# Patient Record
Sex: FEMALE | Race: WHITE | Employment: OTHER | URBAN - METROPOLITAN AREA
[De-identification: names, ages, dates, MRNs, and addresses within clinical notes are randomized per-mention and may not be internally consistent; named-entity substitution may affect disease eponyms.]

---

## 2019-03-26 ENCOUNTER — HOSPITAL ENCOUNTER (OUTPATIENT)
Dept: WOMENS IMAGING | Age: 74
Discharge: HOME | End: 2019-03-26
Payer: MEDICARE

## 2019-03-26 DIAGNOSIS — Z12.39 BREAST CANCER SCREENING: ICD-10-CM

## 2019-03-26 PROCEDURE — 77063 BREAST TOMOSYNTHESIS BI: CPT

## 2019-06-17 ENCOUNTER — OFFICE VISIT (OUTPATIENT)
Dept: ORTHOPEDIC SURGERY | Age: 74
End: 2019-06-17
Payer: MEDICARE

## 2019-06-17 VITALS
HEIGHT: 70 IN | WEIGHT: 270 LBS | SYSTOLIC BLOOD PRESSURE: 148 MMHG | HEART RATE: 71 BPM | DIASTOLIC BLOOD PRESSURE: 73 MMHG | BODY MASS INDEX: 38.65 KG/M2

## 2019-06-17 DIAGNOSIS — M17.11 PRIMARY OSTEOARTHRITIS OF RIGHT KNEE: ICD-10-CM

## 2019-06-17 DIAGNOSIS — M25.561 RIGHT KNEE PAIN, UNSPECIFIED CHRONICITY: Primary | ICD-10-CM

## 2019-06-17 PROCEDURE — 4040F PNEUMOC VAC/ADMIN/RCVD: CPT | Performed by: ORTHOPAEDIC SURGERY

## 2019-06-17 PROCEDURE — 1123F ACP DISCUSS/DSCN MKR DOCD: CPT | Performed by: ORTHOPAEDIC SURGERY

## 2019-06-17 PROCEDURE — 3017F COLORECTAL CA SCREEN DOC REV: CPT | Performed by: ORTHOPAEDIC SURGERY

## 2019-06-17 PROCEDURE — G8419 CALC BMI OUT NRM PARAM NOF/U: HCPCS | Performed by: ORTHOPAEDIC SURGERY

## 2019-06-17 PROCEDURE — 99203 OFFICE O/P NEW LOW 30 MIN: CPT | Performed by: ORTHOPAEDIC SURGERY

## 2019-06-17 PROCEDURE — 1036F TOBACCO NON-USER: CPT | Performed by: ORTHOPAEDIC SURGERY

## 2019-06-17 PROCEDURE — 1090F PRES/ABSN URINE INCON ASSESS: CPT | Performed by: ORTHOPAEDIC SURGERY

## 2019-06-17 PROCEDURE — MISCCOLD COLD THERAPY UNIT AND PAD: Performed by: ORTHOPAEDIC SURGERY

## 2019-06-17 PROCEDURE — G8400 PT W/DXA NO RESULTS DOC: HCPCS | Performed by: ORTHOPAEDIC SURGERY

## 2019-06-17 PROCEDURE — G8427 DOCREV CUR MEDS BY ELIG CLIN: HCPCS | Performed by: ORTHOPAEDIC SURGERY

## 2019-06-17 RX ORDER — QUINAPRIL 20 MG/1
TABLET ORAL
COMMUNITY
Start: 2019-06-07

## 2019-06-17 RX ORDER — PRAVASTATIN SODIUM 80 MG/1
TABLET ORAL
COMMUNITY
Start: 2019-06-07

## 2019-06-17 RX ORDER — LEVOTHYROXINE SODIUM 137 UG/1
TABLET ORAL
COMMUNITY
Start: 2019-06-07

## 2019-06-17 RX ORDER — FLUTICASONE PROPIONATE 50 MCG
SPRAY, SUSPENSION (ML) NASAL
COMMUNITY
Start: 2019-06-07

## 2019-06-17 SDOH — HEALTH STABILITY: MENTAL HEALTH: HOW OFTEN DO YOU HAVE A DRINK CONTAINING ALCOHOL?: 2-4 TIMES A MONTH

## 2019-06-17 NOTE — LETTER
[facility]  Surgery Scheduling Form:  [facility]    DEMOGRAPHICS:                                                                                                              .    Patient Name:  Jemima Keen  Patient :  1945   Patient SS#:      Patient Phone:  [phone] (home)  Alt. Patient Phone:    Patient Address:  [address] [place] [address]    PCP:  Ebony Fragoso MD  Payor/Plan Subscr  Sex Relation Sub. Ins. ID Effective Group Num   1. MUTUAL OF Dolores Herrera 1945 Female  [id number] 3/26/19                                    [id number] [employer] OF [employer]   2. MEDICARE - Catiy Herrera 1945 Female  [id number] 1/1/15                                    [address]      DIAGNOSIS & PROCEDURE:                                                                                            .    Diagnosis: M17.11  Right knee osteoarthritis  Operation:  Right Robotic assisted total knee arthroplasty  Location:  [facility]  Provider:  Lawyer Katalina Gibson M.D.    [facility] INFORMATION:                                                                                         .    Requested Date:    19  OR Time:  9:45                      Patient Arrival Time:  7:45  OR Time Required:  90 Minutes  Anesthesia:  Spinal  Equipment: [employer] ADVANCED  Mini C-Arm:  No   Standard C-Arm:  No  Status: SDA  PAT Required:  Yes  Comments: Allergies: Sulfa antibiotics          19            BILLING INFORMATION:                                                                                                    .    Procedure:       CPT Code Modifier  Right Robotic assisted total knee arthroplasty                12088                                                                                                 [id number]            ORTHOPAEDIC SURGERY PRE-SURGICAL PHYSICIAN ORDERS  Jemima Keen  1945 Date of Surgery: 7/25/19  Right Robotic assisted total knee arthroplasty  History & Physical:  [ ] [facility]   [ ] By Surgeon   By PCP Kenneth ]  Referrals:  [ ] Medical/Cardiac Clearance by _____________________________  Kenneth ] Joint Replacement Class  Kenneth ] Physical Therapy  [ ] Crutch Walking Instructions   Weight Bearing Status _____________  Kenneth ] Occupational Therapy  [ ] Smoking Cessation Instructions  [ ] Other ________________________________________________    Pre Admission Testing:  [ ] Hemoglobin & Hematocrit      Kenneth ] Comprehensive Metabolic Panel  Kenneth ] CBC with differential              Kenneth ] Type & Screen  [ ] CBC without differential       [ ] Type & Crossmatch 2 units-if total hip  Kenneth ] PT/INR                                   [ ] Autologous Blood _____ units  Kenneth ] PTT                                      Kenneth ]  EKG  [ ] Urinalysis                               Kenneth ]  25 Hydroxy Vitamin D  Kenneth ] Urinalysis with C & S    [X] Hemoglobin A1C   [ ] Basic Metabolic Panel         Kenneth ] MRSA-nasal  Kenneth  ] Other Anesthesia Guidelines     Orders to be initiated upon admission to same day surgery:  Kenneth ] Fasting blood sugar by fingerstick [ ] Calderon Curry  Kenneth ] PT/INR (pt takes Warafin/Coumadin)     [ ] Interscalene Right or Left  Kenneth ] HCG __X__ Urine _____ Serum              [ ] Femoral   Right or Left  [ ] Other ______________________________________________    IV Fluids and Medications:  1. Place IV catherer for IV access. The RN may use 0.1ml of 1% Lidocaine SQ      Per site for IV starts up to maximum of 0.5ml.  2. Infuse Lactated Ringers IV fluid at 50ml per hour. For diabetic or has renal             impaired patient, infuse 0.9% Normal Saline at 50ml/hr. 3. Cefazolin 1g IV if <80kg OR 2g if 80-120kg OR 3g if >120kg, given within one hour of incision time. For those allergic to Cephalosporins, give Clindamycin 600mg IV within 1 hour of incision time.  If the pre-op nasal culture for MRSA/MSSA was positive, add Vancomycin 1 gram IVPB, reduce dose of Vancomycin to 500 mg IVPB if PT < 55 kg or serum creatinine > 2 mg/dl (Vancomycin must be administered over 1 hour).   4. Other medications: Celebrex 400mg pre-op    Additional Orders:  Chang Hatch Knee high anti-embolism stockings and antithrombic compression pumps (apply in Pre-op)                            Physican Signature:Date: 6/17/2019 Time: 2:33 PM

## 2019-06-18 RX ORDER — MAGNESIUM 30 MG
30 TABLET ORAL 2 TIMES DAILY
COMMUNITY

## 2019-06-18 RX ORDER — CALCIUM CARBONATE 500(1250)
500 TABLET ORAL DAILY
COMMUNITY

## 2019-06-18 NOTE — PROGRESS NOTES
Name_______________________________________Printed:____________________  Date and time of surgery__7/25 0945______________________Arrival Time:_0745  [facility]ASC_______________   1. Do not eat or drink anything after 12 midnight (or____hours) prior to surgery. This includes no water, chewing gum or mints. Endoscopy patients follow your doctors bowel prep instructions,which may include taking part of prep after midnight. 2. Take the following pills with a small sip of water on the morning of surgery__thyroid_________________________________________________                  Do not take blood pressure medications ending in pril or sartan the gisele prior to surgery or the morning of surgery_   3. Aspirin, Ibuprofen, Advil, Naproxen, Vitamin E and other Anti-inflammatory products should be stopped for 5 days before surgery or as directed by your physician. 4. Check with your Doctor regarding stopping Plavix, Coumadin,Eliquis, Lovenox,Effient,Pradaxa,Xarelto, Fragmin or other blood thinners and follow their instructions. 5. Do not smoke, and do not drink any alcoholic beverages 24 hours prior to surgery. This includes NA Beer. Refrain from the usage of any recreational drugs. 6. You may brush your teeth and gargle the morning of surgery. DO NOT SWALLOW WATER   7. You MUST make arrangements for a responsible adult to stay on site while you are here and take you home after your surgery. You will not be allowed to leave alone or drive yourself home. It is strongly suggested someone stay with you the first 24 hrs. Your surgery will be cancelled if you do not have a ride home. 8. A parent/legal guardian must accompany a child scheduled for surgery and plan to stay at the hospital until the child is discharged. Please do not bring other children with you.    9. Please wear simple, loose fitting clothing to the hospital.  Do not bring valuables (money, credit cards, checkbooks, etc.) Do not wear any makeup (including no eye

## 2019-06-22 NOTE — PROGRESS NOTES
Date of Encounter: 6/17/2019  Patient Name:Jojo Arnett  Medical Record Number: [id number]    Chief Complaint   Patient presents with    Knee Pain     New, RT knee pain. Dr Jd Chaney referral. pain for 7 years, NKI        History of Present Illness:  Dillon Leon is a 69 y.o. female here for evaluation of her right knee. Her current symptoms are described below and I reviewed them with her today. Pain has been present now for at least the last 7 years and can wax and wane. Today her pain is level III but at times it gets up to a 6 or 7. He can interfere with her sleep. She feels like it's continuously affecting her quality of life. She's had previous treatments including injections and activity modification before she moved to this area.   Pain Assessment  Location of Pain: Knee  Location Modifiers: Right  Severity of Pain: 3  Quality of Pain: Aching, Dull  Duration of Pain: Persistent  Frequency of Pain: Constant  Aggravating Factors: Walking, Standing, Stairs  Limiting Behavior: Some  Relieving Factors: Rest  Result of Injury: No  Work-Related Injury: No  Are there other pain locations you wish to document?: No    Past Medical History:   Diagnosis Date    Arthritis     Broken arm     left    Deviated septum     H/O tubal ligation     High blood pressure     History of left knee replacement 2016    Thyroid condition        Past Surgical History:   Procedure Laterality Date    JOINT REPLACEMENT      left knee    NASAL SEPTUM SURGERY      TUBAL LIGATION         Current Outpatient Medications   Medication Sig Dispense Refill    levothyroxine (SYNTHROID) 137 MCG tablet       pravastatin (PRAVACHOL) 80 MG tablet       quinapril (ACCUPRIL) 20 MG tablet       fluticasone (FLONASE) 50 MCG/ACT nasal spray       calcium carbonate (OSCAL) 500 MG TABS tablet Take 500 mg by mouth daily      magnesium 30 MG tablet Take 30 mg by mouth 2 times daily      NONFORMULARY Indications: OTC allergy med No current facility-administered medications for this visit. allergies, social and family histories were reviewed and updated as appropriate. Review of Systems:  Relevant review of systems reviewed and available in the patient's chart and scanned in under the MEDIA tab today. Vital Signs:  BP (!) 148/73   Pulse 71   Ht 5' 10\" (1.778 m)   Wt 270 lb (122.5 kg)   BMI 38.74 kg/m²     General Exam:   Constitutional: She is adequately groomed with no evidence of malnutrition, obesity present  Mental Status: She is oriented to time, place and person. Normal mentation and affect for age. Lymphatic: The lymphatic examination bilaterally reveals all areas to be without enlargement or induration. Vascular: Examination reveals no swelling or calf tenderness. Peripheral pulses are palpable and 2+. Neurological: She has good coordination and balance. There is no focal weakness or sensory deficit. Right Knee Examination:    Inspection:  Knee shows moderate varus alignment  Normal muscle bulk and tone for her age and activity level. No erythema or significant effusion. Palpation:  Tenderness along the joint line. Range of Motion:  Lacks a couple degrees of extension to 115 with moderate pain    Strength:  Quad 4/ 5  ; Hamstrings 4/ 5. Gross motor to hip and ankle intact, no pain with logroll the hip. Stinchfield examination negative. Special Tests:      negative anterior drawer    no posterior sag    no posterior drawer   Negative patellar grind.  does not open to valgus or varus stress at 0 or 30°    negative Homans    Posterior tibial pulses are +2/4 capillary refill is brisk sensation is intact. Skin: There are no rashes, ulcerations or lesions. Gait: Antalgic pattern favoring her right side.     Radiology:     X-rays obtained today and reviewed in office:  Views 4 Right  Knee with comparison AP, flexion PA and skyline views of the left knee  Impression No evidence for acute fracture or subluxation / dislocation. Right knee show severe arthritic change with varus deformity and complete joint space loss primarily medial compartment. She does have a well aligned and apparently well-functioning left total knee arthroplasty based on her radiographs. Impression:  Encounter Diagnoses   Name Primary?  Right knee pain, unspecified chronicity Yes    Primary osteoarthritis of right knee        Office Procedures:  Orders Placed This Encounter   Procedures    Cold Therapy Unit and Pad $150     Scheduling Instructions:      Patient was supplied a Cold Therapy Unit and Pad. This retail item was supplied to provide functional support and assist in protecting the affected area. Verbal and written instructions for the use of and application of this item were provided. The patient was educated and fit by a healthcare professional with expert knowledge and specialization in brace application. They were instructed to contact the office immediately should the equipment result       in increased pain, decreased sensation, increased swelling or worsening of the condition.  XR Knee Bilateral Standing     Standing Status:   Future     Number of Occurrences:   1     Standing Expiration Date:   7/17/2019    XR KNEE RIGHT (3 VIEWS)     Standing Status:   Future     Number of Occurrences:   1     Standing Expiration Date:   7/17/2019    Ambulatory referral to Physical Therapy     Referral Priority:   Routine     Referral Type:   Eval and Treat     Referral Reason:   Specialty Services Required     Requested Specialty:   Physical Therapy     Number of Visits Requested:   1       Treatment Plan:   We discussed treatment options. Through her previous treatments she feels like she has exhausted all conservative efforts including injections and activity modification. We discussed the option of proceeding onto elective right total knee arthroplasty with robotic assistance.   I reviewed with her the nature the procedure as well as the risks and advantages of joint replacement. We reviewed the risks of surgery and she understands that they include but are not limited to: Infection, risk to neurovascular structures, stiffness and pain, potential for further surgery, anesthetic misadventure, component failure or dislocation, fracture of the bone, medical complications such as heart attacks, strokes, blood clots and pneumonia all of which could threaten her life or limb. We reviewed discharge destination as outlined below. We also reviewed the demand matching grid and will use  high demeand bearing surface. [employer]. She will undergo her preadmission testing as well as preoperative physical therapy assessment. She will also attend the preoperative joint education class. Unless there are items that we need to address through testing that would delay her surgery, at this point I will see her at the time of surgery and she will follow back. Should additional questions arise prior to surgery, she was asked to call the office for any clarifications that are necessary. RAPT  RISK ASSESSMENT and PREDICTION TOOL    Name: Melvena Boxer  YOB: 1945  Surgeon: Dr Venkat Casanova         Value Score    1). What is your age group? 50 - 65 years  = 2      66 - 75 years = 1     > 75 years = 0       Your score = 1   2). Gender? Male = 2     Female = 1       Your score = 1   3). How far on average can you walk? Two blocks or more (+/- rest) = 2    (a block is 200 brxyzf=331 ft)  1 - 2 blocks (+/- rest) = 1     Housebound (most of time) = 0       Your score = 0   4). Which gait aid do you use? None = 2    (more often than not) Single-point stick = 1     Crutches/walker = 0       Your score = 2   5). Do you use community supports? None or one per week = 1    (home help, meals on wheels, district nursing) Two or more per week = 0       Your score = 1   6).  Will you live with

## 2019-06-28 ENCOUNTER — HOSPITAL ENCOUNTER (OUTPATIENT)
Dept: PHYSICAL THERAPY | Age: 74
Setting detail: THERAPIES SERIES
Discharge: HOME | End: 2019-06-28
Payer: MEDICARE

## 2019-06-28 PROCEDURE — 97161 PT EVAL LOW COMPLEX 20 MIN: CPT | Performed by: PHYSICAL THERAPIST

## 2019-06-28 PROCEDURE — 97530 THERAPEUTIC ACTIVITIES: CPT | Performed by: PHYSICAL THERAPIST

## 2019-06-28 PROCEDURE — 97110 THERAPEUTIC EXERCISES: CPT | Performed by: PHYSICAL THERAPIST

## 2019-06-28 NOTE — FLOWSHEET NOTE
sessions in improving overall ROM, strength and stability prior to surgery, and how prehabilitation will facilitate improved post-operative outcomes. The patient was educated on and instructed in HEP as listed. The patient was given a detailed handout for exercises to initiate in the hospital post-operatively as well as at home. The discharge plan from the hospital was reviewed with the patient; specifically, to reduce length of hospital stay and to minimize time before reinitiating outpatient physical therapy after surgery. Education regarding early mobility post-operatively in the hospital and emphasis on working with both physical therapy and nursing staff to achieve ambulation goal of 150 feet was provided. The patient was highly encouraged to attend joint class in hospital prior to surgery for further instructions on pre and post-surgical care. Also, education regarding goals and expectations was provided; specifically, knee flexion range of motion greater than or equal to 90 degrees and 0 degrees knee extension to promote improved gait mechanics and ambulation. The patient was educated about all applicable post-op restrictions and precautions. The patient was encouraged to utilize ice/cold pack after surgery to address pain, minimize swelling as often as possible. It is in my medical opinion that this patient is clear from all physical barriers prior to consideration for surgery, activity modifications prior to and post operatively have been discussed with this patient as well as discharge planning and is cleared for surgery from physical therapy perspective.       Therapeutic Exercise and NMR EXR  [x] (25440) Provided verbal/tactile cueing for activities related to strengthening, flexibility, endurance, ROM for improvements in LE, proximal hip, and core control with self care, mobility, lifting, ambulation.  [] (78161) Provided verbal/tactile cueing for activities related to improving balance, coordination, kinesthetic sense, posture, motor skill, proprioception  to assist with LE, proximal hip, and core control in self care, mobility, lifting, ambulation and eccentric single leg control. NMR and Therapeutic Activities:    [x] (16471 or 11034) Provided verbal/tactile cueing for activities related to improving balance, coordination, kinesthetic sense, posture, motor skill, proprioception and motor activation to allow for proper function of core, proximal hip and LE with self care and ADLs  [] (34176) Gait Re-education- Provided training and instruction to the patient for proper LE, core and proximal hip recruitment and positioning and eccentric body weight control with ambulation re-education including up and down stairs     Home Exercise Program:    [x] (48258) Reviewed/Progressed HEP activities related to strengthening, flexibility, endurance, ROM of core, proximal hip and LE for functional self-care, mobility, lifting and ambulation/stair navigation   [] (82648)Reviewed/Progressed HEP activities related to improving balance, coordination, kinesthetic sense, posture, motor skill, proprioception of core, proximal hip and LE for self care, mobility, lifting, and ambulation/stair navigation      Manual Treatments:  PROM / STM / Oscillations-Mobs:  G-I, II, III, IV (PA's, Inf., Post.)  [] (30067) Provided manual therapy to mobilize LE, proximal hip and/or LS spine soft tissue/joints for the purpose of modulating pain, promoting relaxation,  increasing ROM, reducing/eliminating soft tissue swelling/inflammation/restriction, improving soft tissue extensibility and allowing for proper ROM for normal function with self care, mobility, lifting and ambulation.      Modalities:  [] (31260) Vasopneumatic compression: Utilized vasopneumatic compression to decrease edema / swelling for the purpose of improving mobility and quad tone / recruitment which will allow for increased overall function including but not limited to self-care, transfers, ambulation, and ascending / descending stairs. Modalities:      Charges:  Timed Code Treatment Minutes: 25   Total Treatment Minutes: 54     [x] EVAL - LOW (65817)   [] EVAL - MOD (30700)  [] EVAL - HIGH (62183)  [] RE-EVAL (57013)  [x] YD(13185) x  1    [] Ionto  [] NMR (66090) x      [] Vaso  [] Manual (66829) x      [] Ultrasound  [x] TA x 1      [] Mech Traction (05008)  [] Aquatic Therapy x     [] ES (un) (05059):   [] Home Management Training x [] ES(attended) (11270)   [] Group:     [] Other:     GOALS:  Patient stated goal: prepare for surgery    Therapist goals for Patient:   Short Term Goals: To be achieved in: 1 visit  1. Independent in HEP and progression per patient tolerance, in order to prevent re-injury. 2. Patient will have a decrease in pain to facilitate improvement in movement, function, and ADLs as indicated by Functional Deficits. 3. All patient questions regarding expectations for rehab following upcoming surgery are answered. Long Term Goals: long term goals to be determined at re-eval following upcoming surgery    Progression Towards Functional goals:  [] Patient is progressing as expected towards functional goals listed. [] Progression is slowed due to complexities listed. [] Progression has been slowed due to co-morbidities.   [x] Plan just implemented, too soon to assess goals progression  [] Other:     Persisting Functional Limitations/Impairments:  [x]Sitting [x]Standing   [x]Walking [x]Stairs   [x]Transfers [x]ADLs   [x]Squatting/bending [x]Kneeling  [x]Housework []Job related tasks  []Driving [x]Sports/Recreation   [x]Sleeping []Other:    ASSESSMENT:  See eval  Treatment/Activity Tolerance:  [x] Patient tolerated treatment well [] Patient limited by fatique  [] Patient limited by pain  [] Patient limited by other medical complications  [] Other:     Prognosis:  [x] Good [] Fair  [] Poor    Patient Requires Follow-up: [x] Yes  [] No    Return to Play:    [x]  N/A   []  Stage 1: Intro to Strength   []  Stage 2: Return to Run and Strength   []  Stage 3: Return to Jump and Strength   []  Stage 4: Dynamic Strength and Agility   []  Stage 5: Sport Specific Training     []  Ready to Return to Play, Meets All Above Stages   []  Not Ready for Return to Sports   Comments:            PLAN: See eval. PT 1x / week for 1 weeks.  Re-eval NPV following upcoming surgery  [] Continue per plan of care [] Alter current plan (see comments)  [x] Plan of care initiated [] Hold pending MD visit [] Discharge    Electronically signed by: Dea Casillas PT, DPT

## 2019-06-28 NOTE — PLAN OF CARE
Kacie, [address] [facility], [place], operated by [facility], [address] [place] [address]  Phone: [phone]   Fax: [phone]                                                       Physical Therapy Certification    Dear Referring Practitioner: Dr. Marty Venegas,    We had the pleasure of evaluating the following patient for physical therapy services at [address] [place] [address]. A summary of our findings can be found in the initial assessment below. This includes our plan of care. If you have any questions or concerns regarding these findings, please do not hesitate to contact me at the office phone number checked above. Thank you for the referral.       Physician Signature:_______________________________Date:__________________  By signing above (or electronic signature), therapists plan is approved by physician      Patient: Opal Denton   : 1945   MRN: [id number]  Referring Physician: Referring Practitioner: Dr. Marty Venegas      Evaluation Date: 2019      Medical Diagnosis Information:  Diagnosis: M17.11 (ICD-10-CM) - Primary osteoarthritis of right knee   Treatment Diagnosis: R26.2 difficulty walking; M25.561 R knee pain                                         Insurance information: PT Insurance Information: Medicare/[employer] [place] [employer] [place]     Precautions/ Contra-indications: none  Latex Allergy:  [x]NO      []YES  Preferred Language for Healthcare:   [x]English       []other:    SUBJECTIVE: Patient stated complaint: pt. States that her R knee has been hurting for years without specific incident, pt. notes that she was scheduled to have the R knee replaced in 2017 but \"whimped out\"; currently pt. Notes that the pain has gotten to the point that it is limiting her daily activities and she feels that she needs to have it done; pt. Denies injections in R knee; currently pt.  Has weakness and pain limiting walking, squatting, stairs; pt. Reports giving way episodes; pt. Notes sleep disturbances and pain with prolonged positioning; pt. Notes that she becomes fatigued and sore very quickly limiting activity level    R TKA scheduled for 7/25/19    Relevant Medical History: L TKA 2016 with manipulation following, depression, hypertension  Functional Outcome: LEFS 54% limitation    Pain Scale: 2-8/10  Easing factors: naproxen, ice  Provocative factors: increased activity, stairs, transitional movement, rainy weather    Type: [x]Constant   []Intermittent  []Radiating [x]Localized - medial knee []other:     Numbness/Tingling: occasional numbness anterior inferior knee    Occupation/School: retired    Living Status: Will you live with someone who can care for you after surgery? Lives with    Where is the bathroom located in your post-surgery place of residence? Ground floor   Where is the bedroom located in your post-surgery place of residence? floor   Do you use community supports (home help, meals-on-wheels, district nurse)? 0-1 x/wk   How may stairs will you have to climb to get in to your place of residence? 0   Have you had a fall in the past year? Yes - most recent about 4 months previous (tripped)    Prior Level of Function:Prior to this injury / incident, pt was independent with ADLs and IADLs, walking for exercise   Do you use ambulatory aids? Not currently - owns both a walker and cane   How far on average can you walk?  1-2 blocks   What is you physical activity level? sedentary      OBJECTIVE:   Palpation: slight tenderness medial jt. line    Quad: fair    Functional Mobility/Transfers: independent    Posture: mild increased lordosis, weight shift to L in standing    Bandages/Dressings/Incisions: n/a    Gait: (include devices/WB status) FWB without AD, B toeing out, mild antalgic circumducted gait on R    Dermatomes Normal Abnormal Comments   inguinal area (L1)  x     anterior mid-thigh (L2) x     distal ant thigh/med knee (L3) x     medial lower leg and foot (L4) x     lateral lower leg and foot (L5) x     posterior calf (S1) x     medial calcaneus (S2) x            PROM AROM    L R L R   Knee Flexion 119 112 pain 115 108 pain   Knee Extension 0 -3 +2 -1       Strength (0-5) Left Right   Hip Flexion - seated 4+ 4-   Hip Abduction - sidelyling 56.1# 56.4#   Quads 44.0# 42.5# pain   Hamstrings 4+ 4+        Flexibility     Hamstrings (90/90) -25 -20        Girth     Mid patella 50.0 52.0   Suprapatellar 52.0 52.5       Joint mobility: patellofemoral    []Normal    [x]Hypo   []Hyper         Functional testing Pre hab        Date 6/28/19 Re eval post op   Date  4 week f/u   Date  8 week f/u  Date  D/c    TUG (s) 9       30 second sit to stand -8 w/ UE support  -Unable without UE       6 minute walk (m) 384               Balance (s)        Narrow TARA 10       Semi tandem 10       Tandem  8       SLS 5               Knee AROM        Knee flexion 108       Knee extension -1               Strength (#)        Knee Extension 42.5#       Hip aBduction  56.4#               LEFS 37/80                              [x] Patient history, allergies, meds reviewed. Medical chart reviewed. See intake form. Review Of Systems (ROS):  [x]Performed Review of systems (Integumentary, CardioPulmonary, Neurological) by intake and observation. Intake form has been scanned into medical record. Patient has been instructed to contact their primary care physician regarding ROS issues if not already being addressed at this time.       Co-morbidities/Complexities (which will affect course of rehabilitation):   []None           Arthritic conditions   []Rheumatoid arthritis (M05.9)  [x]Osteoarthritis (M19.91)   Cardiovascular conditions   []Hypertension (I10)  []Hyperlipidemia (E78.5)  []Angina pectoris (I20)  []Atherosclerosis (I70)   Musculoskeletal conditions   []Disc pathology   []Congenital spine pathologies   [x]Prior surgical intervention  []Osteoporosis (M81.8)  []Osteopenia (M85.8)   Endocrine conditions   []Hypothyroid (E03.9)  []Hyperthyroid Gastrointestinal conditions   []Constipation (P71.48)   Metabolic conditions   []Morbid obesity (E66.01)  []Diabetes type 1(E10.65) or 2 (E11.65)   []Neuropathy (G60.9)     Pulmonary conditions   []Asthma (J45)  []Coughing   []COPD (J44.9)   Psychological Disorders  []Anxiety (F41.9)  [x]Depression (F32.9)   []Other:   []Other:          Barriers to/and or personal factors that will affect rehab potential:              []Age  []Sex    []Smoker              [x]Motivation/Lack of Motivation                        [x]Co-Morbidities              []Cognitive Function, education/learning barriers              []Environmental, home barriers              []profession/work barriers  []past PT/medical experience  []other:  Justification:     Falls Risk Assessment (30 days):   [x] Falls Risk assessed and no intervention required.   [] Falls Risk assessed and Patient requires intervention due to being higher risk   TUG score (>12s at risk):     [] Falls education provided, including         ASSESSMENT:   Functional Impairments:     []Noted lumbar/proximal hip/LE joint hypomobility   [x]Decreased LE functional ROM   [x]Decreased core/proximal hip strength and neuromuscular control   [x]Decreased LE functional strength   [x]Reduced balance/proprioceptive control   []other:      Functional Activity Limitations (from functional questionnaire and intake)   [x]Reduced ability to tolerate prolonged functional positions   [x]Reduced ability or difficulty with changes of positions or transfers between positions   [x]Reduced ability to maintain good posture and demonstrate good body mechanics with sitting, bending, and lifting   [x]Reduced ability to sleep   [x] Reduced ability or tolerance with driving and/or computer work   [x]Reduced ability to perform lifting, carrying tasks   [x]Reduced ability to squat   [x]Reduced ability to forward bend   [x]Reduced ability to ambulate prolonged functional periods/distances/surfaces   [x]Reduced ability to ascend/descend stairs   [x]Reduced ability to run, hop, cut or jump   []other:    Participation Restrictions   [x]Reduced participation in self care activities   [x]Reduced participation in home management activities   []Reduced participation in work activities   [x]Reduced participation in social activities. [x]Reduced participation in sport/recreation activities. Classification :    []Signs/symptoms consistent with post-surgical status including decreased ROM, strength and function.    []Signs/symptoms consistent with joint sprain/strain   []Signs/symptoms consistent with patella-femoral syndrome   [x]Signs/symptoms consistent with knee OA/hip OA   []Signs/symptoms consistent with internal derangement of knee/Hip   []Signs/symptoms consistent with functional hip weakness/NMR control      []Signs/symptoms consistent with tendinitis/tendinosis    []signs/symptoms consistent with pathology which may benefit from Dry needling      []other:      Prognosis/Rehab Potential:      []Excellent   [x]Good    []Fair   []Poor    Tolerance of evaluation/treatment:    []Excellent   [x]Good    []Fair   []Poor    Physical Therapy Evaluation Complexity Justification  [x] A history of present problem with:  [] no personal factors and/or comorbidities that impact the plan of care;  [x]1-2 personal factors and/or comorbidities that impact the plan of care  []3 personal factors and/or comorbidities that impact the plan of care  [x] An examination of body systems using standardized tests and measures addressing any of the following: body structures and functions (impairments), activity limitations, and/or participation restrictions;:  [] a total of 1-2 or more elements   [] a total of 3 or more elements   [x] a total of 4 or more elements   [x] A clinical presentation with:  [x] stable and/or uncomplicated characteristics   [] evolving clinical presentation with changing characteristics  [] unstable and unpredictable characteristics;   [x] Clinical decision making of [x] low, [] moderate, [] high complexity using standardized patient assessment instrument and/or measurable assessment of functional outcome. [x] EVAL (LOW) 12694 (typically 30 minutes face-to-face)  [] EVAL (MOD) 54574 (typically 30 minutes face-to-face)  [] EVAL (HIGH) 53060 (typically 45 minutes face-to-face)  [] RE-EVAL     PLAN:   Frequency/Duration:  1 days per week for 1 Weeks: (additional visit frequency to be determined at post-op re-eval)  Interventions:  [x]  Therapeutic exercise including: strength training, ROM, for Lower extremity and core   [x]  NMR activation and proprioception for LE, Glutes and Core   [x]  Manual therapy as indicated for LE, Hip and spine to include: Dry Needling/IASTM, STM, PROM, Gr I-IV mobilizations, manipulation. [x] Modalities as needed that may include: thermal agents, E-stim, Biofeedback, US, iontophoresis as indicated  [x] Patient education on joint protection, postural re-education, activity modification, progression of HEP. HEP instruction: Pt. Provided with written and illustrated instructions for home program. Pt to perform exercises 1-2x day f/b ice 15 min.   (distributed general pre-op TKA HEP handout)    Patient education:  Patient was thoroughly educated on this date regarding prehabilitation goals, importance of PT sessions in improving overall ROM, strength and stability prior to surgery, and how prehabilitation will facilitate improved post-operative outcomes. The patient was educated on and instructed in HEP as listed. The patient was given a detailed handout for exercises to initiate in the hospital post-operatively as well as at home.  The discharge plan from the hospital was reviewed with the patient; specifically, to reduce length of hospital stay and to minimize time

## 2019-07-12 ENCOUNTER — HOSPITAL ENCOUNTER (OUTPATIENT)
Age: 74
Discharge: HOME | End: 2019-07-12
Payer: MEDICARE

## 2019-07-12 DIAGNOSIS — Z01.818 PREOP TESTING: ICD-10-CM

## 2019-07-12 LAB
A/G RATIO: 1.3 (ref 1.1–2.2)
ABO/RH: NORMAL
ABO/RH: NORMAL
ALBUMIN SERPL-MCNC: 4.3 G/DL (ref 3.4–5)
ALP BLD-CCNC: 76 U/L (ref 40–129)
ALT SERPL-CCNC: 13 U/L (ref 10–40)
ANION GAP SERPL CALCULATED.3IONS-SCNC: 13 MMOL/L (ref 3–16)
ANTIBODY SCREEN: NORMAL
APTT: 31.3 SEC (ref 26–36)
AST SERPL-CCNC: 16 U/L (ref 15–37)
BACTERIA: ABNORMAL /HPF
BASOPHILS ABSOLUTE: 0.1 K/UL (ref 0–0.2)
BASOPHILS RELATIVE PERCENT: 0.9 %
BILIRUB SERPL-MCNC: <0.2 MG/DL (ref 0–1)
BILIRUBIN URINE: NEGATIVE
BLOOD, URINE: NEGATIVE
BUN BLDV-MCNC: 16 MG/DL (ref 7–20)
CALCIUM SERPL-MCNC: 9.7 MG/DL (ref 8.3–10.6)
CHLORIDE BLD-SCNC: 104 MMOL/L (ref 99–110)
CLARITY: ABNORMAL
CO2: 26 MMOL/L (ref 21–32)
COLOR: YELLOW
CREAT SERPL-MCNC: 0.7 MG/DL (ref 0.6–1.2)
EKG ATRIAL RATE: 80 BPM
EKG DIAGNOSIS: NORMAL
EKG P AXIS: 60 DEGREES
EKG P-R INTERVAL: 164 MS
EKG Q-T INTERVAL: 390 MS
EKG QRS DURATION: 92 MS
EKG QTC CALCULATION (BAZETT): 449 MS
EKG R AXIS: -42 DEGREES
EKG T AXIS: 72 DEGREES
EKG VENTRICULAR RATE: 80 BPM
EOSINOPHILS ABSOLUTE: 0.2 K/UL (ref 0–0.6)
EOSINOPHILS RELATIVE PERCENT: 3 %
EPITHELIAL CELLS, UA: 3 /HPF (ref 0–5)
GFR AFRICAN AMERICAN: >60
GFR NON-AFRICAN AMERICAN: >60
GLOBULIN: 3.2 G/DL
GLUCOSE BLD-MCNC: 74 MG/DL (ref 70–99)
GLUCOSE URINE: NEGATIVE MG/DL
HCT VFR BLD CALC: 44.8 % (ref 36–48)
HEMOGLOBIN: 14.9 G/DL (ref 12–16)
HYALINE CASTS: 9 /LPF (ref 0–8)
INR BLD: 1.04 (ref 0.86–1.14)
KETONES, URINE: NEGATIVE MG/DL
LEUKOCYTE ESTERASE, URINE: NEGATIVE
LYMPHOCYTES ABSOLUTE: 2 K/UL (ref 1–5.1)
LYMPHOCYTES RELATIVE PERCENT: 30 %
MCH RBC QN AUTO: 30.5 PG (ref 26–34)
MCHC RBC AUTO-ENTMCNC: 33.3 G/DL (ref 31–36)
MCV RBC AUTO: 91.6 FL (ref 80–100)
MICROSCOPIC EXAMINATION: YES
MONOCYTES ABSOLUTE: 0.5 K/UL (ref 0–1.3)
MONOCYTES RELATIVE PERCENT: 7.1 %
NEUTROPHILS ABSOLUTE: 3.9 K/UL (ref 1.7–7.7)
NEUTROPHILS RELATIVE PERCENT: 59 %
NITRITE, URINE: NEGATIVE
PDW BLD-RTO: 14 % (ref 12.4–15.4)
PH UA: 6 (ref 5–8)
PLATELET # BLD: 323 K/UL (ref 135–450)
PMV BLD AUTO: 9.2 FL (ref 5–10.5)
POTASSIUM SERPL-SCNC: 3.8 MMOL/L (ref 3.5–5.1)
PROTEIN UA: NEGATIVE MG/DL
PROTHROMBIN TIME: 11.8 SEC (ref 9.8–13)
RBC # BLD: 4.89 M/UL (ref 4–5.2)
RBC UA: ABNORMAL /HPF (ref 0–2)
SODIUM BLD-SCNC: 143 MMOL/L (ref 136–145)
SPECIFIC GRAVITY UA: 1.02 (ref 1–1.03)
TOTAL PROTEIN: 7.5 G/DL (ref 6.4–8.2)
URINE TYPE: ABNORMAL
UROBILINOGEN, URINE: 0.2 E.U./DL
VITAMIN D 25-HYDROXY: 28.6 NG/ML
WBC # BLD: 6.6 K/UL (ref 4–11)
WBC UA: 6 /HPF (ref 0–5)

## 2019-07-12 PROCEDURE — 93010 ELECTROCARDIOGRAM REPORT: CPT | Performed by: INTERNAL MEDICINE

## 2019-07-12 PROCEDURE — 83036 HEMOGLOBIN GLYCOSYLATED A1C: CPT

## 2019-07-12 PROCEDURE — 36415 COLL VENOUS BLD VENIPUNCTURE: CPT

## 2019-07-12 PROCEDURE — 81001 URINALYSIS AUTO W/SCOPE: CPT

## 2019-07-12 PROCEDURE — 86901 BLOOD TYPING SEROLOGIC RH(D): CPT

## 2019-07-12 PROCEDURE — 80053 COMPREHEN METABOLIC PANEL: CPT

## 2019-07-12 PROCEDURE — 93005 ELECTROCARDIOGRAM TRACING: CPT | Performed by: ORTHOPAEDIC SURGERY

## 2019-07-12 PROCEDURE — 85025 COMPLETE CBC W/AUTO DIFF WBC: CPT

## 2019-07-12 PROCEDURE — 85610 PROTHROMBIN TIME: CPT

## 2019-07-12 PROCEDURE — 87081 CULTURE SCREEN ONLY: CPT

## 2019-07-12 PROCEDURE — 86900 BLOOD TYPING SEROLOGIC ABO: CPT

## 2019-07-12 PROCEDURE — 85730 THROMBOPLASTIN TIME PARTIAL: CPT

## 2019-07-12 PROCEDURE — 87086 URINE CULTURE/COLONY COUNT: CPT

## 2019-07-12 PROCEDURE — 82306 VITAMIN D 25 HYDROXY: CPT

## 2019-07-12 PROCEDURE — 86850 RBC ANTIBODY SCREEN: CPT

## 2019-07-13 LAB
ESTIMATED AVERAGE GLUCOSE: 108.3 MG/DL
HBA1C MFR BLD: 5.4 %

## 2019-07-14 ENCOUNTER — TELEPHONE (OUTPATIENT)
Dept: ORTHOPEDIC SURGERY | Age: 74
End: 2019-07-14

## 2019-07-14 LAB
MRSA CULTURE ONLY: NORMAL
URINE CULTURE, ROUTINE: NORMAL

## 2019-07-14 RX ORDER — MULTIVIT-MIN/IRON/FOLIC ACID/K 18-600-40
2000 CAPSULE ORAL DAILY
Qty: 30 CAPSULE | Refills: 3 | Status: SHIPPED | OUTPATIENT
Start: 2019-07-14 | End: 2019-11-07 | Stop reason: SDUPTHER

## 2019-07-25 ENCOUNTER — ANESTHESIA (OUTPATIENT)
Dept: OPERATING ROOM | Age: 74
End: 2019-07-25
Payer: MEDICARE

## 2019-07-25 ENCOUNTER — APPOINTMENT (OUTPATIENT)
Dept: GENERAL RADIOLOGY | Age: 74
End: 2019-07-25
Attending: ORTHOPAEDIC SURGERY
Payer: MEDICARE

## 2019-07-25 ENCOUNTER — HOSPITAL ENCOUNTER (OUTPATIENT)
Age: 74
Discharge: HOME | End: 2019-07-26
Attending: ORTHOPAEDIC SURGERY | Admitting: ORTHOPAEDIC SURGERY
Payer: MEDICARE

## 2019-07-25 ENCOUNTER — ANESTHESIA EVENT (OUTPATIENT)
Dept: OPERATING ROOM | Age: 74
End: 2019-07-25
Payer: MEDICARE

## 2019-07-25 VITALS — DIASTOLIC BLOOD PRESSURE: 62 MMHG | TEMPERATURE: 97.2 F | SYSTOLIC BLOOD PRESSURE: 134 MMHG | OXYGEN SATURATION: 92 %

## 2019-07-25 DIAGNOSIS — Z01.818 PREOP TESTING: Primary | ICD-10-CM

## 2019-07-25 DIAGNOSIS — Z96.651 S/P TOTAL KNEE ARTHROPLASTY, RIGHT: ICD-10-CM

## 2019-07-25 PROBLEM — M17.11 ARTHRITIS OF RIGHT KNEE: Status: ACTIVE | Noted: 2019-07-25

## 2019-07-25 LAB
ABO/RH: NORMAL
ANTIBODY SCREEN: NORMAL
GLUCOSE BLD-MCNC: 160 MG/DL (ref 70–99)
GLUCOSE BLD-MCNC: 94 MG/DL (ref 70–99)
GLUCOSE BLD-MCNC: 96 MG/DL (ref 70–99)
GLUCOSE BLD-MCNC: 97 MG/DL (ref 70–99)
PERFORMED ON: ABNORMAL
PERFORMED ON: NORMAL

## 2019-07-25 PROCEDURE — 6360000002 HC RX W HCPCS: Performed by: ANESTHESIOLOGY

## 2019-07-25 PROCEDURE — 7100000000 HC PACU RECOVERY - FIRST 15 MIN: Performed by: ORTHOPAEDIC SURGERY

## 2019-07-25 PROCEDURE — 2580000003 HC RX 258: Performed by: ORTHOPAEDIC SURGERY

## 2019-07-25 PROCEDURE — 2500000003 HC RX 250 WO HCPCS: Performed by: ORTHOPAEDIC SURGERY

## 2019-07-25 PROCEDURE — 6370000000 HC RX 637 (ALT 250 FOR IP): Performed by: ORTHOPAEDIC SURGERY

## 2019-07-25 PROCEDURE — C1776 JOINT DEVICE (IMPLANTABLE): HCPCS | Performed by: ORTHOPAEDIC SURGERY

## 2019-07-25 PROCEDURE — 6360000002 HC RX W HCPCS: Performed by: ORTHOPAEDIC SURGERY

## 2019-07-25 PROCEDURE — 1200000000 HC SEMI PRIVATE

## 2019-07-25 PROCEDURE — 86901 BLOOD TYPING SEROLOGIC RH(D): CPT

## 2019-07-25 PROCEDURE — 94150 VITAL CAPACITY TEST: CPT

## 2019-07-25 PROCEDURE — 6360000002 HC RX W HCPCS: Performed by: NURSE ANESTHETIST, CERTIFIED REGISTERED

## 2019-07-25 PROCEDURE — 3600000004 HC SURGERY LEVEL 4 BASE: Performed by: ORTHOPAEDIC SURGERY

## 2019-07-25 PROCEDURE — 73560 X-RAY EXAM OF KNEE 1 OR 2: CPT

## 2019-07-25 PROCEDURE — 64447 NJX AA&/STRD FEMORAL NRV IMG: CPT | Performed by: ANESTHESIOLOGY

## 2019-07-25 PROCEDURE — 2709999900 HC NON-CHARGEABLE SUPPLY: Performed by: ORTHOPAEDIC SURGERY

## 2019-07-25 PROCEDURE — 94760 N-INVAS EAR/PLS OXIMETRY 1: CPT

## 2019-07-25 PROCEDURE — 86900 BLOOD TYPING SEROLOGIC ABO: CPT

## 2019-07-25 PROCEDURE — 3600000014 HC SURGERY LEVEL 4 ADDTL 15MIN: Performed by: ORTHOPAEDIC SURGERY

## 2019-07-25 PROCEDURE — C1713 ANCHOR/SCREW BN/BN,TIS/BN: HCPCS | Performed by: ORTHOPAEDIC SURGERY

## 2019-07-25 PROCEDURE — 86850 RBC ANTIBODY SCREEN: CPT

## 2019-07-25 PROCEDURE — 99024 POSTOP FOLLOW-UP VISIT: CPT | Performed by: NURSE PRACTITIONER

## 2019-07-25 PROCEDURE — 2580000003 HC RX 258: Performed by: NURSE ANESTHETIST, CERTIFIED REGISTERED

## 2019-07-25 PROCEDURE — 3700000000 HC ANESTHESIA ATTENDED CARE: Performed by: ORTHOPAEDIC SURGERY

## 2019-07-25 PROCEDURE — 2500000003 HC RX 250 WO HCPCS: Performed by: NURSE ANESTHETIST, CERTIFIED REGISTERED

## 2019-07-25 PROCEDURE — 2580000003 HC RX 258: Performed by: ANESTHESIOLOGY

## 2019-07-25 PROCEDURE — 96372 THER/PROPH/DIAG INJ SC/IM: CPT

## 2019-07-25 PROCEDURE — 3700000001 HC ADD 15 MINUTES (ANESTHESIA): Performed by: ORTHOPAEDIC SURGERY

## 2019-07-25 PROCEDURE — 2720000010 HC SURG SUPPLY STERILE: Performed by: ORTHOPAEDIC SURGERY

## 2019-07-25 PROCEDURE — APPNB60 APP NON BILLABLE TIME 46-60 MINS: Performed by: NURSE PRACTITIONER

## 2019-07-25 PROCEDURE — 7100000001 HC PACU RECOVERY - ADDTL 15 MIN: Performed by: ORTHOPAEDIC SURGERY

## 2019-07-25 DEVICE — RALLY HV ALL IN ONE SYSTEM 70 GRAMS
Type: IMPLANTABLE DEVICE | Site: KNEE | Status: FUNCTIONAL
Brand: RALLY

## 2019-07-25 DEVICE — JOURNEY II BCS FEMORAL OXINIUM                                    RIGHT SIZE 6
Type: IMPLANTABLE DEVICE | Site: KNEE | Status: FUNCTIONAL
Brand: JOURNEY

## 2019-07-25 DEVICE — CEMENT BNE 20ML 40GM FULL DOSE PMMA W/O ANTIBIO M VISC: Type: IMPLANTABLE DEVICE | Site: KNEE | Status: FUNCTIONAL

## 2019-07-25 DEVICE — JOURNEY BCS PATELLA RESURFACING                                    ROUND 32 MM STANDARD
Type: IMPLANTABLE DEVICE | Site: KNEE | Status: FUNCTIONAL
Brand: JOURNEY

## 2019-07-25 DEVICE — JOURNEY TIBIAL BASEPLATE NONPOROUS                                    RIGHT SIZE 4
Type: IMPLANTABLE DEVICE | Site: KNEE | Status: FUNCTIONAL
Brand: JOURNEY

## 2019-07-25 DEVICE — JOURNEY II BCS XLPE ARTICULAR                                    INSERT SZ 3-4 RIGHT 12MM
Type: IMPLANTABLE DEVICE | Site: KNEE | Status: FUNCTIONAL
Brand: JOURNEY

## 2019-07-25 RX ORDER — DEXTROSE MONOHYDRATE 50 MG/ML
100 INJECTION, SOLUTION INTRAVENOUS PRN
Status: DISCONTINUED | OUTPATIENT
Start: 2019-07-25 | End: 2019-07-26 | Stop reason: HOSPADM

## 2019-07-25 RX ORDER — CETIRIZINE HYDROCHLORIDE 10 MG/1
10 TABLET ORAL DAILY
Status: DISCONTINUED | OUTPATIENT
Start: 2019-07-25 | End: 2019-07-26 | Stop reason: HOSPADM

## 2019-07-25 RX ORDER — FENTANYL CITRATE 50 UG/ML
25 INJECTION, SOLUTION INTRAMUSCULAR; INTRAVENOUS EVERY 5 MIN PRN
Status: DISCONTINUED | OUTPATIENT
Start: 2019-07-25 | End: 2019-07-25

## 2019-07-25 RX ORDER — SODIUM CHLORIDE 9 MG/ML
INJECTION, SOLUTION INTRAVENOUS CONTINUOUS
Status: DISCONTINUED | OUTPATIENT
Start: 2019-07-25 | End: 2019-07-26 | Stop reason: HOSPADM

## 2019-07-25 RX ORDER — MIDAZOLAM HYDROCHLORIDE 1 MG/ML
1 INJECTION INTRAMUSCULAR; INTRAVENOUS EVERY 10 MIN PRN
Status: DISCONTINUED | OUTPATIENT
Start: 2019-07-25 | End: 2019-07-26 | Stop reason: HOSPADM

## 2019-07-25 RX ORDER — SODIUM CHLORIDE 9 MG/ML
INJECTION, SOLUTION INTRAVENOUS CONTINUOUS
Status: DISCONTINUED | OUTPATIENT
Start: 2019-07-25 | End: 2019-07-25

## 2019-07-25 RX ORDER — HYDROMORPHONE HCL 110MG/55ML
0.5 PATIENT CONTROLLED ANALGESIA SYRINGE INTRAVENOUS EVERY 5 MIN PRN
Status: DISCONTINUED | OUTPATIENT
Start: 2019-07-25 | End: 2019-07-25

## 2019-07-25 RX ORDER — FAMOTIDINE 20 MG/1
20 TABLET, FILM COATED ORAL 2 TIMES DAILY
Status: DISCONTINUED | OUTPATIENT
Start: 2019-07-25 | End: 2019-07-26 | Stop reason: HOSPADM

## 2019-07-25 RX ORDER — HYDROMORPHONE HCL 110MG/55ML
0.25 PATIENT CONTROLLED ANALGESIA SYRINGE INTRAVENOUS EVERY 5 MIN PRN
Status: DISCONTINUED | OUTPATIENT
Start: 2019-07-25 | End: 2019-07-25

## 2019-07-25 RX ORDER — SODIUM CHLORIDE 0.9 % (FLUSH) 0.9 %
10 SYRINGE (ML) INJECTION EVERY 12 HOURS SCHEDULED
Status: DISCONTINUED | OUTPATIENT
Start: 2019-07-25 | End: 2019-07-26 | Stop reason: HOSPADM

## 2019-07-25 RX ORDER — KETAMINE HCL IN NACL, ISO-OSM 100MG/10ML
SYRINGE (ML) INJECTION PRN
Status: DISCONTINUED | OUTPATIENT
Start: 2019-07-25 | End: 2019-07-25 | Stop reason: SDUPTHER

## 2019-07-25 RX ORDER — LEVOTHYROXINE SODIUM 0.12 MG/1
125 TABLET ORAL DAILY
Status: DISCONTINUED | OUTPATIENT
Start: 2019-07-26 | End: 2019-07-26 | Stop reason: HOSPADM

## 2019-07-25 RX ORDER — LORATADINE 10 MG/1
10 TABLET ORAL DAILY
COMMUNITY

## 2019-07-25 RX ORDER — LISINOPRIL 20 MG/1
20 TABLET ORAL DAILY
Status: DISCONTINUED | OUTPATIENT
Start: 2019-07-25 | End: 2019-07-26 | Stop reason: HOSPADM

## 2019-07-25 RX ORDER — LIDOCAINE HYDROCHLORIDE 20 MG/ML
INJECTION, SOLUTION EPIDURAL; INFILTRATION; INTRACAUDAL; PERINEURAL PRN
Status: DISCONTINUED | OUTPATIENT
Start: 2019-07-25 | End: 2019-07-25 | Stop reason: SDUPTHER

## 2019-07-25 RX ORDER — BACITRACIN 50000 [USP'U]/1
INJECTION, POWDER, LYOPHILIZED, FOR SOLUTION INTRAMUSCULAR
Status: COMPLETED | OUTPATIENT
Start: 2019-07-25 | End: 2019-07-25

## 2019-07-25 RX ORDER — GLYCOPYRROLATE 1 MG/5 ML
SYRINGE (ML) INTRAVENOUS PRN
Status: DISCONTINUED | OUTPATIENT
Start: 2019-07-25 | End: 2019-07-25 | Stop reason: SDUPTHER

## 2019-07-25 RX ORDER — ONDANSETRON 2 MG/ML
4 INJECTION INTRAMUSCULAR; INTRAVENOUS
Status: DISCONTINUED | OUTPATIENT
Start: 2019-07-25 | End: 2019-07-25

## 2019-07-25 RX ORDER — PRAVASTATIN SODIUM 80 MG/1
80 TABLET ORAL NIGHTLY
Status: DISCONTINUED | OUTPATIENT
Start: 2019-07-25 | End: 2019-07-26 | Stop reason: HOSPADM

## 2019-07-25 RX ORDER — FENTANYL CITRATE 50 UG/ML
50 INJECTION, SOLUTION INTRAMUSCULAR; INTRAVENOUS
Status: DISCONTINUED | OUTPATIENT
Start: 2019-07-25 | End: 2019-07-26 | Stop reason: HOSPADM

## 2019-07-25 RX ORDER — OXYCODONE HYDROCHLORIDE 5 MG/1
5 TABLET ORAL EVERY 4 HOURS PRN
Status: DISCONTINUED | OUTPATIENT
Start: 2019-07-25 | End: 2019-07-26 | Stop reason: HOSPADM

## 2019-07-25 RX ORDER — OXYCODONE HYDROCHLORIDE 5 MG/1
10 TABLET ORAL EVERY 4 HOURS PRN
Status: DISCONTINUED | OUTPATIENT
Start: 2019-07-25 | End: 2019-07-26 | Stop reason: HOSPADM

## 2019-07-25 RX ORDER — ROPIVACAINE HYDROCHLORIDE 5 MG/ML
30 INJECTION, SOLUTION EPIDURAL; INFILTRATION; PERINEURAL ONCE
Status: DISCONTINUED | OUTPATIENT
Start: 2019-07-25 | End: 2019-07-26 | Stop reason: HOSPADM

## 2019-07-25 RX ORDER — DEXTROSE MONOHYDRATE 25 G/50ML
12.5 INJECTION, SOLUTION INTRAVENOUS PRN
Status: DISCONTINUED | OUTPATIENT
Start: 2019-07-25 | End: 2019-07-26 | Stop reason: HOSPADM

## 2019-07-25 RX ORDER — SODIUM CHLORIDE, SODIUM LACTATE, POTASSIUM CHLORIDE, CALCIUM CHLORIDE 600; 310; 30; 20 MG/100ML; MG/100ML; MG/100ML; MG/100ML
INJECTION, SOLUTION INTRAVENOUS CONTINUOUS
Status: DISCONTINUED | OUTPATIENT
Start: 2019-07-25 | End: 2019-07-25 | Stop reason: SDUPTHER

## 2019-07-25 RX ORDER — LIDOCAINE HYDROCHLORIDE 10 MG/ML
0.5 INJECTION, SOLUTION EPIDURAL; INFILTRATION; INTRACAUDAL; PERINEURAL ONCE
Status: DISCONTINUED | OUTPATIENT
Start: 2019-07-25 | End: 2019-07-25

## 2019-07-25 RX ORDER — MAGNESIUM 30 MG
30 TABLET ORAL 2 TIMES DAILY
Status: DISCONTINUED | OUTPATIENT
Start: 2019-07-25 | End: 2019-07-25 | Stop reason: CLARIF

## 2019-07-25 RX ORDER — BUPIVACAINE HYDROCHLORIDE AND EPINEPHRINE 5; 5 MG/ML; UG/ML
INJECTION, SOLUTION PERINEURAL
Status: COMPLETED | OUTPATIENT
Start: 2019-07-25 | End: 2019-07-25

## 2019-07-25 RX ORDER — SODIUM CHLORIDE 0.9 % (FLUSH) 0.9 %
10 SYRINGE (ML) INJECTION EVERY 12 HOURS SCHEDULED
Status: DISCONTINUED | OUTPATIENT
Start: 2019-07-25 | End: 2019-07-25

## 2019-07-25 RX ORDER — CELECOXIB 200 MG/1
400 CAPSULE ORAL ONCE
Status: COMPLETED | OUTPATIENT
Start: 2019-07-25 | End: 2019-07-25

## 2019-07-25 RX ORDER — HYDROMORPHONE HYDROCHLORIDE 1 MG/ML
0.25 INJECTION, SOLUTION INTRAMUSCULAR; INTRAVENOUS; SUBCUTANEOUS
Status: DISCONTINUED | OUTPATIENT
Start: 2019-07-25 | End: 2019-07-26 | Stop reason: HOSPADM

## 2019-07-25 RX ORDER — PROPOFOL 10 MG/ML
INJECTION, EMULSION INTRAVENOUS PRN
Status: DISCONTINUED | OUTPATIENT
Start: 2019-07-25 | End: 2019-07-25 | Stop reason: SDUPTHER

## 2019-07-25 RX ORDER — FLUTICASONE PROPIONATE 50 MCG
1 SPRAY, SUSPENSION (ML) NASAL NIGHTLY
Status: DISCONTINUED | OUTPATIENT
Start: 2019-07-25 | End: 2019-07-26 | Stop reason: HOSPADM

## 2019-07-25 RX ORDER — ONDANSETRON 2 MG/ML
INJECTION INTRAMUSCULAR; INTRAVENOUS PRN
Status: DISCONTINUED | OUTPATIENT
Start: 2019-07-25 | End: 2019-07-25 | Stop reason: SDUPTHER

## 2019-07-25 RX ORDER — BUPIVACAINE HYDROCHLORIDE 7.5 MG/ML
INJECTION, SOLUTION INTRASPINAL PRN
Status: DISCONTINUED | OUTPATIENT
Start: 2019-07-25 | End: 2019-07-25 | Stop reason: SDUPTHER

## 2019-07-25 RX ORDER — FENTANYL CITRATE 50 UG/ML
50 INJECTION, SOLUTION INTRAMUSCULAR; INTRAVENOUS EVERY 5 MIN PRN
Status: DISCONTINUED | OUTPATIENT
Start: 2019-07-25 | End: 2019-07-25

## 2019-07-25 RX ORDER — LABETALOL HYDROCHLORIDE 5 MG/ML
5 INJECTION, SOLUTION INTRAVENOUS EVERY 10 MIN PRN
Status: DISCONTINUED | OUTPATIENT
Start: 2019-07-25 | End: 2019-07-25

## 2019-07-25 RX ORDER — SODIUM CHLORIDE 0.9 % (FLUSH) 0.9 %
10 SYRINGE (ML) INJECTION PRN
Status: DISCONTINUED | OUTPATIENT
Start: 2019-07-25 | End: 2019-07-25

## 2019-07-25 RX ORDER — CALCIUM CARBONATE 500(1250)
500 TABLET ORAL DAILY
Status: DISCONTINUED | OUTPATIENT
Start: 2019-07-25 | End: 2019-07-26 | Stop reason: HOSPADM

## 2019-07-25 RX ORDER — ONDANSETRON 2 MG/ML
4 INJECTION INTRAMUSCULAR; INTRAVENOUS EVERY 6 HOURS PRN
Status: DISCONTINUED | OUTPATIENT
Start: 2019-07-25 | End: 2019-07-26 | Stop reason: HOSPADM

## 2019-07-25 RX ORDER — DEXAMETHASONE SODIUM PHOSPHATE 4 MG/ML
INJECTION, SOLUTION INTRA-ARTICULAR; INTRALESIONAL; INTRAMUSCULAR; INTRAVENOUS; SOFT TISSUE PRN
Status: DISCONTINUED | OUTPATIENT
Start: 2019-07-25 | End: 2019-07-25 | Stop reason: SDUPTHER

## 2019-07-25 RX ORDER — SODIUM CHLORIDE 0.9 % (FLUSH) 0.9 %
10 SYRINGE (ML) INJECTION PRN
Status: DISCONTINUED | OUTPATIENT
Start: 2019-07-25 | End: 2019-07-26 | Stop reason: HOSPADM

## 2019-07-25 RX ORDER — PROPOFOL 10 MG/ML
INJECTION, EMULSION INTRAVENOUS CONTINUOUS PRN
Status: DISCONTINUED | OUTPATIENT
Start: 2019-07-25 | End: 2019-07-25 | Stop reason: SDUPTHER

## 2019-07-25 RX ORDER — SODIUM CHLORIDE, SODIUM LACTATE, POTASSIUM CHLORIDE, CALCIUM CHLORIDE 600; 310; 30; 20 MG/100ML; MG/100ML; MG/100ML; MG/100ML
INJECTION, SOLUTION INTRAVENOUS CONTINUOUS
Status: DISCONTINUED | OUTPATIENT
Start: 2019-07-25 | End: 2019-07-25

## 2019-07-25 RX ORDER — FENTANYL CITRATE 50 UG/ML
100 INJECTION, SOLUTION INTRAMUSCULAR; INTRAVENOUS ONCE
Status: COMPLETED | OUTPATIENT
Start: 2019-07-25 | End: 2019-07-25

## 2019-07-25 RX ORDER — DOCUSATE SODIUM 100 MG/1
100 CAPSULE, LIQUID FILLED ORAL 2 TIMES DAILY
Status: DISCONTINUED | OUTPATIENT
Start: 2019-07-25 | End: 2019-07-26 | Stop reason: HOSPADM

## 2019-07-25 RX ORDER — MIDAZOLAM HYDROCHLORIDE 1 MG/ML
2 INJECTION INTRAMUSCULAR; INTRAVENOUS ONCE
Status: COMPLETED | OUTPATIENT
Start: 2019-07-25 | End: 2019-07-25

## 2019-07-25 RX ORDER — NICOTINE POLACRILEX 4 MG
15 LOZENGE BUCCAL PRN
Status: DISCONTINUED | OUTPATIENT
Start: 2019-07-25 | End: 2019-07-26 | Stop reason: HOSPADM

## 2019-07-25 RX ORDER — HYDROMORPHONE HYDROCHLORIDE 1 MG/ML
0.5 INJECTION, SOLUTION INTRAMUSCULAR; INTRAVENOUS; SUBCUTANEOUS
Status: DISCONTINUED | OUTPATIENT
Start: 2019-07-25 | End: 2019-07-26 | Stop reason: HOSPADM

## 2019-07-25 RX ADMIN — OXYCODONE HYDROCHLORIDE 10 MG: 5 TABLET ORAL at 20:34

## 2019-07-25 RX ADMIN — OXYCODONE HYDROCHLORIDE 5 MG: 5 TABLET ORAL at 16:36

## 2019-07-25 RX ADMIN — PHENYLEPHRINE HYDROCHLORIDE 100 MCG: 10 INJECTION INTRAVENOUS at 12:05

## 2019-07-25 RX ADMIN — FENTANYL CITRATE 100 MCG: 50 INJECTION INTRAMUSCULAR; INTRAVENOUS at 09:42

## 2019-07-25 RX ADMIN — PHENYLEPHRINE HYDROCHLORIDE 100 MCG: 10 INJECTION INTRAVENOUS at 12:09

## 2019-07-25 RX ADMIN — PHENYLEPHRINE HYDROCHLORIDE 100 MCG: 10 INJECTION INTRAVENOUS at 12:15

## 2019-07-25 RX ADMIN — PHENYLEPHRINE HYDROCHLORIDE 100 MCG: 10 INJECTION INTRAVENOUS at 12:01

## 2019-07-25 RX ADMIN — Medication 5 MG: at 11:53

## 2019-07-25 RX ADMIN — TRANEXAMIC ACID 1000 MG: 1 INJECTION, SOLUTION INTRAVENOUS at 10:11

## 2019-07-25 RX ADMIN — SODIUM CHLORIDE, POTASSIUM CHLORIDE, SODIUM LACTATE AND CALCIUM CHLORIDE: 600; 310; 30; 20 INJECTION, SOLUTION INTRAVENOUS at 09:30

## 2019-07-25 RX ADMIN — Medication 3 G: at 10:39

## 2019-07-25 RX ADMIN — DEXAMETHASONE SODIUM PHOSPHATE 6 MG: 4 INJECTION, SOLUTION INTRA-ARTICULAR; INTRALESIONAL; INTRAMUSCULAR; INTRAVENOUS; SOFT TISSUE at 11:01

## 2019-07-25 RX ADMIN — DOCUSATE SODIUM 100 MG: 100 CAPSULE, LIQUID FILLED ORAL at 20:34

## 2019-07-25 RX ADMIN — Medication 5 MG: at 12:35

## 2019-07-25 RX ADMIN — MIDAZOLAM 2 MG: 1 INJECTION INTRAMUSCULAR; INTRAVENOUS at 09:42

## 2019-07-25 RX ADMIN — PRAVASTATIN SODIUM 80 MG: 80 TABLET ORAL at 20:34

## 2019-07-25 RX ADMIN — Medication 5 MG: at 13:01

## 2019-07-25 RX ADMIN — CELECOXIB 400 MG: 200 CAPSULE ORAL at 09:15

## 2019-07-25 RX ADMIN — SODIUM CHLORIDE: 9 INJECTION, SOLUTION INTRAVENOUS at 16:39

## 2019-07-25 RX ADMIN — Medication 0.2 MG: at 11:53

## 2019-07-25 RX ADMIN — ONDANSETRON 4 MG: 2 INJECTION INTRAMUSCULAR; INTRAVENOUS at 11:01

## 2019-07-25 RX ADMIN — TRANEXAMIC ACID 1000 MG: 1 INJECTION, SOLUTION INTRAVENOUS at 13:26

## 2019-07-25 RX ADMIN — DEXTROSE MONOHYDRATE 3 G: 50 INJECTION, SOLUTION INTRAVENOUS at 19:16

## 2019-07-25 RX ADMIN — PHENYLEPHRINE HYDROCHLORIDE 100 MCG: 10 INJECTION INTRAVENOUS at 11:54

## 2019-07-25 RX ADMIN — LIDOCAINE HYDROCHLORIDE 60 MG: 20 INJECTION, SOLUTION EPIDURAL; INFILTRATION; INTRACAUDAL; PERINEURAL at 11:01

## 2019-07-25 RX ADMIN — SODIUM CHLORIDE, POTASSIUM CHLORIDE, SODIUM LACTATE AND CALCIUM CHLORIDE: 600; 310; 30; 20 INJECTION, SOLUTION INTRAVENOUS at 12:02

## 2019-07-25 RX ADMIN — FLUTICASONE PROPIONATE 1 SPRAY: 50 SPRAY, METERED NASAL at 20:36

## 2019-07-25 RX ADMIN — SODIUM CHLORIDE, POTASSIUM CHLORIDE, SODIUM LACTATE AND CALCIUM CHLORIDE: 600; 310; 30; 20 INJECTION, SOLUTION INTRAVENOUS at 10:00

## 2019-07-25 RX ADMIN — CALCIUM 500 MG: 500 TABLET ORAL at 16:36

## 2019-07-25 RX ADMIN — FENTANYL CITRATE 50 MCG: 50 INJECTION INTRAMUSCULAR; INTRAVENOUS at 14:42

## 2019-07-25 RX ADMIN — PROPOFOL 20 MG: 10 INJECTION, EMULSION INTRAVENOUS at 11:01

## 2019-07-25 RX ADMIN — LISINOPRIL 20 MG: 20 TABLET ORAL at 16:36

## 2019-07-25 RX ADMIN — CETIRIZINE HYDROCHLORIDE 10 MG: 10 TABLET, FILM COATED ORAL at 20:34

## 2019-07-25 RX ADMIN — PHENYLEPHRINE HYDROCHLORIDE 50 MCG/MIN: 10 INJECTION INTRAVENOUS at 12:17

## 2019-07-25 RX ADMIN — FAMOTIDINE 20 MG: 20 TABLET ORAL at 20:34

## 2019-07-25 RX ADMIN — MAGNESIUM OXIDE TAB 400 MG (241.3 MG ELEMENTAL MG) 400 MG: 400 (241.3 MG) TAB at 20:34

## 2019-07-25 RX ADMIN — VITAMIN D, TAB 1000IU (100/BT) 2000 UNITS: 25 TAB at 16:37

## 2019-07-25 RX ADMIN — ENOXAPARIN SODIUM 40 MG: 40 INJECTION SUBCUTANEOUS at 22:27

## 2019-07-25 RX ADMIN — PROPOFOL 120 MCG/KG/MIN: 10 INJECTION, EMULSION INTRAVENOUS at 11:01

## 2019-07-25 RX ADMIN — BUPIVACAINE HYDROCHLORIDE 2 ML: 7.5 INJECTION, SOLUTION INTRASPINAL at 10:54

## 2019-07-25 RX ADMIN — PHENYLEPHRINE HYDROCHLORIDE 50 MCG: 10 INJECTION INTRAVENOUS at 11:50

## 2019-07-25 ASSESSMENT — PULMONARY FUNCTION TESTS
PIF_VALUE: 0
PIF_VALUE: 1
PIF_VALUE: 0
PIF_VALUE: 1
PIF_VALUE: 0
PIF_VALUE: 0
PIF_VALUE: 1
PIF_VALUE: 0
PIF_VALUE: 1
PIF_VALUE: 0
PIF_VALUE: 1
PIF_VALUE: 0
PIF_VALUE: 0
PIF_VALUE: 12
PIF_VALUE: 0

## 2019-07-25 ASSESSMENT — PAIN SCALES - GENERAL
PAINLEVEL_OUTOF10: 1
PAINLEVEL_OUTOF10: 4
PAINLEVEL_OUTOF10: 1
PAINLEVEL_OUTOF10: 7
PAINLEVEL_OUTOF10: 4
PAINLEVEL_OUTOF10: 4
PAINLEVEL_OUTOF10: 7

## 2019-07-25 ASSESSMENT — PAIN DESCRIPTION - ORIENTATION: ORIENTATION: RIGHT

## 2019-07-25 ASSESSMENT — PAIN DESCRIPTION - PAIN TYPE: TYPE: SURGICAL PAIN

## 2019-07-25 ASSESSMENT — PAIN DESCRIPTION - LOCATION: LOCATION: KNEE

## 2019-07-25 NOTE — PROGRESS NOTES
Block completed. Pt tolerated well.  Post procedure VS= 64-16 135/71  O2 sat remained 98-99% throughout procedure

## 2019-07-25 NOTE — PROGRESS NOTES
Handoff report to [facility],safety precautions,call light in reach,family at bedside,vss.

## 2019-07-25 NOTE — H&P
Patient seen and examined. I have reviewed the history and physical and examined the patient and find no relevant changes. Surgery plan reviewed  Site marked and consent verified. All questions answered and antibiotic verified. Ready for right total knee arthroplasty with imageless robotic assistance    Salena Torres.  Jett, [address] [place] [address] and Sports Medicine  07/25/19  10:30 AM

## 2019-07-26 VITALS
HEART RATE: 70 BPM | DIASTOLIC BLOOD PRESSURE: 71 MMHG | OXYGEN SATURATION: 97 % | SYSTOLIC BLOOD PRESSURE: 135 MMHG | WEIGHT: 270 LBS | BODY MASS INDEX: 39.99 KG/M2 | TEMPERATURE: 98 F | HEIGHT: 69 IN | RESPIRATION RATE: 18 BRPM

## 2019-07-26 PROBLEM — Z96.651 S/P TOTAL KNEE ARTHROPLASTY, RIGHT: Status: ACTIVE | Noted: 2019-07-26

## 2019-07-26 LAB
ANION GAP SERPL CALCULATED.3IONS-SCNC: 10 MMOL/L (ref 3–16)
BUN BLDV-MCNC: 15 MG/DL (ref 7–20)
CALCIUM SERPL-MCNC: 8.7 MG/DL (ref 8.3–10.6)
CHLORIDE BLD-SCNC: 105 MMOL/L (ref 99–110)
CO2: 22 MMOL/L (ref 21–32)
CREAT SERPL-MCNC: 0.8 MG/DL (ref 0.6–1.2)
GFR AFRICAN AMERICAN: >60
GFR NON-AFRICAN AMERICAN: >60
GLUCOSE BLD-MCNC: 109 MG/DL (ref 70–99)
GLUCOSE BLD-MCNC: 112 MG/DL (ref 70–99)
GLUCOSE BLD-MCNC: 123 MG/DL (ref 70–99)
HCT VFR BLD CALC: 39.4 % (ref 36–48)
HEMOGLOBIN: 12.8 G/DL (ref 12–16)
MCH RBC QN AUTO: 29.9 PG (ref 26–34)
MCHC RBC AUTO-ENTMCNC: 32.4 G/DL (ref 31–36)
MCV RBC AUTO: 92 FL (ref 80–100)
PDW BLD-RTO: 14.4 % (ref 12.4–15.4)
PERFORMED ON: ABNORMAL
PERFORMED ON: ABNORMAL
PLATELET # BLD: 257 K/UL (ref 135–450)
PMV BLD AUTO: 8.9 FL (ref 5–10.5)
POTASSIUM SERPL-SCNC: 4.3 MMOL/L (ref 3.5–5.1)
RBC # BLD: 4.28 M/UL (ref 4–5.2)
SODIUM BLD-SCNC: 137 MMOL/L (ref 136–145)
WBC # BLD: 13 K/UL (ref 4–11)

## 2019-07-26 PROCEDURE — 2580000003 HC RX 258: Performed by: ORTHOPAEDIC SURGERY

## 2019-07-26 PROCEDURE — 94760 N-INVAS EAR/PLS OXIMETRY 1: CPT

## 2019-07-26 PROCEDURE — APPNB60 APP NON BILLABLE TIME 46-60 MINS: Performed by: NURSE PRACTITIONER

## 2019-07-26 PROCEDURE — 96360 HYDRATION IV INFUSION INIT: CPT

## 2019-07-26 PROCEDURE — 97165 OT EVAL LOW COMPLEX 30 MIN: CPT

## 2019-07-26 PROCEDURE — 80048 BASIC METABOLIC PNL TOTAL CA: CPT

## 2019-07-26 PROCEDURE — 85027 COMPLETE CBC AUTOMATED: CPT

## 2019-07-26 PROCEDURE — 36415 COLL VENOUS BLD VENIPUNCTURE: CPT

## 2019-07-26 PROCEDURE — 97530 THERAPEUTIC ACTIVITIES: CPT

## 2019-07-26 PROCEDURE — 6370000000 HC RX 637 (ALT 250 FOR IP): Performed by: ORTHOPAEDIC SURGERY

## 2019-07-26 PROCEDURE — 6360000002 HC RX W HCPCS: Performed by: ORTHOPAEDIC SURGERY

## 2019-07-26 PROCEDURE — 97161 PT EVAL LOW COMPLEX 20 MIN: CPT

## 2019-07-26 PROCEDURE — 97535 SELF CARE MNGMENT TRAINING: CPT

## 2019-07-26 PROCEDURE — 97116 GAIT TRAINING THERAPY: CPT

## 2019-07-26 PROCEDURE — 99024 POSTOP FOLLOW-UP VISIT: CPT | Performed by: NURSE PRACTITIONER

## 2019-07-26 PROCEDURE — 96361 HYDRATE IV INFUSION ADD-ON: CPT

## 2019-07-26 RX ORDER — OXYCODONE HYDROCHLORIDE 5 MG/1
5-10 TABLET ORAL EVERY 4 HOURS PRN
Qty: 40 TABLET | Refills: 0 | Status: SHIPPED | OUTPATIENT
Start: 2019-07-26 | End: 2019-08-02

## 2019-07-26 RX ORDER — ASPIRIN 325 MG
325 TABLET, DELAYED RELEASE (ENTERIC COATED) ORAL 2 TIMES DAILY
Qty: 28 TABLET | Refills: 0 | Status: SHIPPED | OUTPATIENT
Start: 2019-07-26 | End: 2019-08-30 | Stop reason: ALTCHOICE

## 2019-07-26 RX ORDER — ACETAMINOPHEN 325 MG/1
650 TABLET ORAL EVERY 4 HOURS PRN
Qty: 60 TABLET | Refills: 0 | Status: SHIPPED | OUTPATIENT
Start: 2019-07-26 | End: 2019-08-30 | Stop reason: SDUPTHER

## 2019-07-26 RX ADMIN — OXYCODONE HYDROCHLORIDE 10 MG: 5 TABLET ORAL at 15:29

## 2019-07-26 RX ADMIN — LEVOTHYROXINE SODIUM 125 MCG: 125 TABLET ORAL at 05:24

## 2019-07-26 RX ADMIN — MAGNESIUM OXIDE TAB 400 MG (241.3 MG ELEMENTAL MG) 400 MG: 400 (241.3 MG) TAB at 07:52

## 2019-07-26 RX ADMIN — OXYCODONE HYDROCHLORIDE 10 MG: 5 TABLET ORAL at 00:52

## 2019-07-26 RX ADMIN — FAMOTIDINE 20 MG: 20 TABLET ORAL at 07:52

## 2019-07-26 RX ADMIN — OXYCODONE HYDROCHLORIDE 10 MG: 5 TABLET ORAL at 05:24

## 2019-07-26 RX ADMIN — LISINOPRIL 20 MG: 20 TABLET ORAL at 07:52

## 2019-07-26 RX ADMIN — SODIUM CHLORIDE: 9 INJECTION, SOLUTION INTRAVENOUS at 01:01

## 2019-07-26 RX ADMIN — CALCIUM 500 MG: 500 TABLET ORAL at 07:51

## 2019-07-26 RX ADMIN — VITAMIN D, TAB 1000IU (100/BT) 2000 UNITS: 25 TAB at 07:52

## 2019-07-26 RX ADMIN — OXYCODONE HYDROCHLORIDE 10 MG: 5 TABLET ORAL at 10:11

## 2019-07-26 RX ADMIN — DOCUSATE SODIUM 100 MG: 100 CAPSULE, LIQUID FILLED ORAL at 07:51

## 2019-07-26 RX ADMIN — DEXTROSE MONOHYDRATE 3 G: 50 INJECTION, SOLUTION INTRAVENOUS at 03:02

## 2019-07-26 ASSESSMENT — PAIN SCALES - GENERAL
PAINLEVEL_OUTOF10: 2
PAINLEVEL_OUTOF10: 7
PAINLEVEL_OUTOF10: 6
PAINLEVEL_OUTOF10: 6

## 2019-07-26 ASSESSMENT — PAIN DESCRIPTION - ORIENTATION
ORIENTATION: RIGHT
ORIENTATION: RIGHT

## 2019-07-26 ASSESSMENT — PAIN DESCRIPTION - LOCATION
LOCATION: KNEE
LOCATION: KNEE

## 2019-07-26 ASSESSMENT — PAIN DESCRIPTION - PAIN TYPE: TYPE: SURGICAL PAIN

## 2019-07-26 NOTE — PLAN OF CARE
Problem: Pain:  Goal: Pain level will decrease  Description  Pain level will decrease  7/26/2019 0810 by Faye Flowers RN  Outcome: Ongoing  7/26/2019 0455 by Tammy Michel RN  Outcome: Ongoing  Goal: Control of acute pain  Description  Control of acute pain  7/26/2019 0810 by Faye Flowers RN  Outcome: Ongoing  7/26/2019 0455 by Tammy Michel RN  Outcome: Ongoing  Goal: Control of chronic pain  Description  Control of chronic pain  7/26/2019 0810 by Faye Flowers RN  Outcome: Ongoing  7/26/2019 0455 by Tammy Michel RN  Outcome: Ongoing     Problem: Falls - Risk of:  Goal: Will remain free from falls  Description  Will remain free from falls  7/26/2019 0810 by Faye Flowers RN  Outcome: Ongoing  7/26/2019 0455 by Tammy Michel RN  Outcome: Ongoing  Goal: Absence of physical injury  Description  Absence of physical injury  7/26/2019 0810 by Faye Flowers RN  Outcome: Ongoing  7/26/2019 0455 by Tammy Michel RN  Outcome: Ongoing     Problem: Discharge Planning:  Goal: Discharged to appropriate level of care  Description  Discharged to appropriate level of care  7/26/2019 0810 by Faye Flowers RN  Outcome: Ongoing  7/26/2019 0455 by Tammy Michel RN  Outcome: Ongoing     Problem: Mobility - Impaired:  Goal: Mobility will improve  Description  Mobility will improve  7/26/2019 0810 by Faye Flowers RN  Outcome: Ongoing  7/26/2019 0455 by Tammy Michel RN  Outcome: Ongoing     Problem: Infection - Surgical Site:  Goal: Will show no infection signs and symptoms  Description  Will show no infection signs and symptoms  7/26/2019 0810 by Faye Flowers RN  Outcome: Ongoing  7/26/2019 0455 by Tammy Michel RN  Outcome: Ongoing     Problem: Cardiac:  Goal: Ability to maintain vital signs within normal range will improve  Description  Ability to maintain vital signs within normal range will improve  Outcome: Ongoing  Goal: Cardiovascular alteration will improve  Description  Cardiovascular alteration will

## 2019-07-26 NOTE — PROGRESS NOTES
Physical Therapy    Facility/Department: [address] ORTHO/NEURO NURSING  Initial Assessment    NAME: Keven Prader  : 1945  MRN: [id number]    Date of Service: 2019    Discharge Recommendations: Keven Prader scored a 19/24 on the AM-PAC short mobility form. Current research shows that an AM-PAC score of 18 or greater is typically associated with a discharge to the patient's home setting. Based on the patients AM-PAC score and their current functional mobility deficits, it is recommended that the patient have 2-3 sessions per week of Physical Therapy at d/c to increase the patients independence. HOME HEALTH CARE: LEVEL 1 STANDARD    - Initial home health evaluation to occur within 24-48 hours, in patient home   - Therapy to evaluate with goal of regaining prior level of functioning   - Therapy to evaluate if patient has [address] needs for personal care      PT Equipment Recommendations  Equipment Needed: No    Assessment   Body structures, Functions, Activity limitations: Decreased functional mobility ; Decreased strength;Decreased endurance;Decreased balance; Increased Pain  Assessment: Pt able to ambulate 200' with RW and supervision this date. Pt able to negotiate 4 steps with SBA and nonreciporcal pattern. Pt would benefit from skilled PT to improve functional mobility and safely return to PLOF. Treatment Diagnosis: decreased functional mobility and strength  Prognosis: Good  Decision Making: Low Complexity  Exam: ROM, strength, balance  Clinical Presentation: stable  Patient Education: d/c recommendation  Barriers to Learning: none  REQUIRES PT FOLLOW UP: Yes  Activity Tolerance  Activity Tolerance: Patient Tolerated treatment well       Patient Diagnosis(es): The encounter diagnosis was Preop testing. has a past medical history of Arthritis, Broken arm, Deviated septum, H/O tubal ligation, High blood pressure, History of left knee replacement, and Thyroid condition. steadiness without RW   Stairs/Curb  Stairs?: Yes  Stairs  # Steps : 4  Stairs Height: 4\"  Rails: Bilateral  Device: No Device  Assistance: Stand by assistance  Comment: unsuccessful attempt for reciporcal pattern; pt successful with nonreciporcal ascending with LLE    Balance  Posture: Good  Sitting - Static: Good  Sitting - Dynamic: Good(sitting EOB ~5 mins)  Standing - Static: Fair(with RW increased weight through UE; supervision assist)  Standing - Dynamic: Fair     Second Session  S: Pt supine in bed willing to participate in car transfer. States she just had pain meds about 30 min ago. O:Supervision with bed mobility; sit-stand SBA; Pt amb 150' SBA with RW. Pt performed car transfer with SBA. Increased L upper body leaning to get R LE into car due to decreased knee ROM. Pt returned to bed with SBA. A: Pt tolerated well, no issue with performing car transfer. P: cont POC. Plan   Plan  Times per week: 7x   Times per day: Twice a day  Current Treatment Recommendations: Strengthening, ADL/Self-care Training, Gait Training, Stair training, Balance Training, Functional Mobility Training, Endurance Training, Transfer Training, Safety Education & Training, Positioning, Pain Management, Modalities, Equipment Evaluation, Education, & procurement, Patient/Caregiver Education & Training  Safety Devices  Type of devices:  All fall risk precautions in place, Call light within reach, Chair alarm in place, Gait belt, Patient at risk for falls, Nurse notified, Left in chair  Restraints  Initially in place: No    G-Code       OutComes Score       AM-PAC Score  AM-PAC Inpatient Mobility Raw Score : 19 (07/26/19 1025)  AM-PAC Inpatient T-Scale Score : 45.44 (07/26/19 1025)  Mobility Inpatient CMS 0-100% Score: 41.77 (07/26/19 1025)  Mobility Inpatient CMS G-Code Modifier : CK (07/26/19 1025)          Goals  Short term goals  Time Frame for Short term goals: upon d/c  Short term goal 1: Pt will complete bed mobility with mod I.   Short term goal 2: Pt will complete sit <> stand with mod I. Short term goal 3: Pt will ambulate 300' with LRAD and mod I. Short term goal 4: Pt will negotiate curb step with LRAD and supervision. Short term goal 5: Pt will have R knee flexion AROM 90 degrees. Short term goal 6: Pt will negotiate car transfer with LRAD and SBA.--MET 7/26  Short term goal 7: Pt will negotiate car transfer with LRAD and supervision  Patient Goals   Patient goals : return to walking more    1 GOAL MET THIS DATE      Therapy Time   Individual Concurrent Group Co-treatment   Time In 0832         Time Out 0918         Minutes 46              Timed Code Treatment Minutes:   31    Total Treatment Minutes:  [address] [facility], [facility]    PT providing direct supervision during session and assisting in making skilled judgements throughout session. [address] [facility], [place] [address] [facility], PT     Second Session Therapy Time:   Individual Concurrent Group Co-treatment   Time In 1106         Time Out 1116         Minutes 10           Timed Code Treatment Minutes: 10     Total Treatment Minutes:  [address] [place], [place] [address]      I agree with the note above. Goals addressed by PT this session.    [address] [place], [place] [id number]

## 2019-07-26 NOTE — DISCHARGE SUMMARY
Patient ID:  Alicja Heredia  [id number]  1945    Admit date: 7/25/2019    Discharge date: No discharge date for patient encounter. Attending Physician: Christopher Nava MD     Admission Diagnoses:   Preop testing [W48.627]  Arthritis of right knee [M17.11]  S/P total knee arthroplasty, right [Z96.651]    Discharge Diagnoses: Active Problems:    Arthritis of right knee    S/P total knee arthroplasty, right  Resolved Problems:    * No resolved hospital problems. *    Past Medical History:   Diagnosis Date    Arthritis     Broken arm     left    Deviated septum     H/O tubal ligation     High blood pressure     History of left knee replacement 2016    Thyroid condition        Indication for Admission: Alicja Heredia is a 69 y.o. female who presented with right knee osteoarthritis that was not responsive to conservative measures. she carries a history of hypertension. Operations/Procedures Performed:   1. right total knee arthroplasty    Hospital Course: Patient admitted on 7/25/2019 and underwent abovementioned procedure(s) on 7/25/19. Tolerated the procedure well with no complications. Please see full operative report for further details regarding the operation. Postoperatively transferred to the floor in stable condition. Pain controlled post-op with IV/oral pain medication. Diet was advanced and tolerated this well. At time of discharge, the patient was tolerating oral food and hydration, voiding spontaneously, had return of bowel function, was ambulating without difficulty, and pain was controlled on oral medications. The patient was determined to be suitable for discharge and the patient felt comfortable with that decision. Consults: Physical and Occupational Therapy, Social Work    Significant Diagnostic Studies:   Post-op radiographs as expected. Post-op labs unremarkable.     Discharge Exam:  /71   Pulse 70   Temp 98 °F (36.7 °C) (Oral)   Resp 18   Ht 5' 9\"

## 2019-07-26 NOTE — PROGRESS NOTES
Occupational Therapy   Occupational Therapy Initial Assessment  Date: 2019   Patient Name: Denise Del Valle  MRN: [id number]     : 1945    Date of Service: 2019    Discharge Recommendations:    Denise Del Valle scored a 20/24 on the AM-PAC ADL Inpatient form. Current research shows that an AM-PAC score of 18 or greater is typically associated with a discharge to the patient's home setting. Based on the patients AM-PAC score and their current ADL deficits, it is recommended that the patient have 2-3 sessions per week of Occupational Therapy at d/c to increase the patients independence. HOME HEALTH CARE: LEVEL 1 STANDARD    - Initial home health evaluation to occur within 24-48 hours, in patient home   - Therapy to evaluate with goal of regaining prior level of functioning   - Therapy to evaluate if patient has [address] needs for personal care   OT Equipment Recommendations  Equipment Needed: No  Other: Continue to assess    Assessment   Performance deficits / Impairments: Decreased functional mobility ; Decreased ADL status; Decreased high-level IADLs;Decreased endurance  Assessment: Pt is not at baseline of occupational function as evidenced by the above deficits associated w/ s/p R TKA. Pt would benefit from continued skilled acute OT services to address these deficits. Treatment Diagnosis: Decreased functional mobility, IADLs, ADLs, endurance associated w/ s/p R TKA. Prognosis: Good  Decision Making: Low Complexity  History: Pt independent prior. PMH: L knee replacement, L broken arm, high BP  Exam: ROM, MMT, 6-Clicks, 4 performance deficits/impairments, stable presentation  Assistance / Modification: Supervision  REQUIRES OT FOLLOW UP: Yes  Activity Tolerance  Activity Tolerance: Patient Tolerated treatment well  Activity Tolerance: Pt tolerated treatment well, ambulating ~200 ft and maneuvering stairs during eval w/ no LOB or c/o pain.  Pt w/ pain w/ sit>stand from toilet but [employer]  Coordination  Movements Are Fluid And Coordinated: Yes     Bed mobility  Supine to Sit: Supervision  Scooting: Supervision  Comment: HOB elevated; bed rails used  Transfers  Sit to stand: Stand by assistance;Supervision(SBA for toilet; supervision from EOB x2, recliner)  Stand to sit: Supervision(to EOB, to recliner x2, to toilet)  Transfer Comments: Pt SBA for sit>stand from toilet w/ pt pausing w/ use of grab bar mid stand d/t pain  Vision - Basic Assessment  Prior Vision: Wears glasses all the time  Visual History: No significant visual history  Patient Visual Report: No visual complaint reported. Visual Field Cut: Not tested  Cognition  Overall Cognitive Status: WNL  Perception  Overall Perceptual Status: WFL     Sensation  Overall Sensation Status: WFL(decreased sensation on L LE around knee d/t past TKA )        LUE AROM (degrees)  LUE AROM : WNL  Left Hand AROM (degrees)  Left Hand AROM: WNL  RUE AROM (degrees)  RUE AROM : WNL  Right Hand AROM (degrees)  Right Hand AROM: WNL  LUE Strength  Gross LUE Strength: WNL  L Hand General: 4/5  RUE Strength  Gross RUE Strength:  WNL  R Hand General: 4/5                   Plan   Plan  Times per week: 5-7  Times per day: Daily  Current Treatment Recommendations: Balance Training, Endurance Training, Patient/Caregiver Education & Training, Home Management Training, Functional Mobility Training, Safety Education & Training, Equipment Evaluation, Education, & procurement, Self-Care / ADL      AM-PAC Score        [facility] Inpatient Daily Activity Raw Score: 20 (07/26/19 1105)  AM-PAC Inpatient ADL T-Scale Score : 42.03 (07/26/19 1105)  ADL Inpatient CMS 0-100% Score: 38.32 (07/26/19 1105)  ADL Inpatient CMS G-Code Modifier : Jose Ornelas (07/26/19 1105)    Goals  Short term goals  Time Frame for Short term goals: Discharge  Short term goal 1: Mod I functional transfers to ADL surfaces w/ RW  Short term goal 2: Mod I functional mobility for ADL tasks w/ RW  Short term goal 3:

## 2019-07-27 ENCOUNTER — CARE COORDINATION (OUTPATIENT)
Dept: CASE MANAGEMENT | Age: 74
End: 2019-07-27

## 2019-07-27 NOTE — CARE COORDINATION
[facility]    [facility] status:  Dressing intact to site, aware is not to be removed until post op visit. Denies any seepage on dressing    Edema/Swelling:  States slightly more edema than when she left hospital but contributes to activity r/t coming home-getting in/out of car, etc.  Has been using ice and is helping    Pain level and status: States took pain pill before bed and again around 2am, and took again around 9am as PT is due to be there at 10am.  States pain isn't terrible and pain meds are effective    Use of pain medications: See above    Bowels: No BM but states didn't eat that great while in hosp, doesn't feel need to have BM. States she is passing gas, did take stool softner. Home Care Agency active: [facility]-PT due out today    Outpatient therapy: n/a    Do you have all of your medications: yes    Changes in medications: just new orders for Oxycodone and to take ASA 325mg BID [id number]    States she feels like this leg isn't as bad as when she had other leg done. Reviewed all d/c instructions that were given to her, she verbalized understanding.      Follow up appointments:    Future Appointments   Date Time Provider Hafsa Westfall   8/9/2019 10:15 AM Leigh Elizondo MD [facility]   8/9/2019  1:30 PM YOLIS Alfaro PT None

## 2019-07-29 ENCOUNTER — CARE COORDINATION (OUTPATIENT)
Dept: CASE MANAGEMENT | Age: 74
End: 2019-07-29

## 2019-07-29 NOTE — PROGRESS NOTES
Physical Therapy    Physical Therapy Discharge Summary    Name: Mari Zarco  : 1945    The pt was evaluated by PT on 19 and seen for 1 treatment sessions prior to DC to home on 19 per MD order. The pt's acute therapy goals were:  Short term goals  Time Frame for Short term goals: upon d/c  Short term goal 1: Pt will complete bed mobility with mod I. Short term goal 2: Pt will complete sit <> stand with mod I. Short term goal 3: Pt will ambulate 300' with LRAD and mod I. Short term goal 4: Pt will negotiate curb step with LRAD and supervision. Short term goal 5: Pt will have R knee flexion AROM 90 degrees. Short term goal 6: Pt will negotiate car transfer with LRAD and SBA.--MET   Short term goal 7: Pt will negotiate car transfer with LRAD and supervision       Patient met 1 goals during stay. Number of Refusals:0  Number of Holds: 0  During this hospitalization, the patient was educated on:  Patient Education: d/c recommendation    DC pt from PT caseload at this time. Thank you!   Cristobal Ross, DPT [id number]

## 2019-08-05 NOTE — OP NOTE
cleansed with pulsatile lavage. The knee was then taken out of extension position and any excess cement was carefully removed with a quarter-inch osteotome. The knee continued show excellent tracking with good range of motion and stability to varus and valgus stress. The trial polyethylene was removed. Care was taken to ensure all posterior cement was removed and the knee was thoroughly irrigated with pulsatile lavage. The final 12 mm polyethylene surface was then snapped into position without difficulty and again showed similar stability, range of motion, and patellar tracking as trials. The tourniquet was then deflated and hemostasis was achieved. 30 mL of orthopedic analgesia mixture diluted in saline was carefully infiltrated into the distal femur periosteum and anterior capsular tissue. The joint was closed with running #2 Stratfix. Skin was closed with interrupted 2-0 Vicryl and running 3-0 Monocryl. Dermabond and Prineo dressing were applied and allowed to dry. The dressing was covered with Therabond silver. The limb was wrapped with sterile cast padding at the knee and from the foot to the thigh with an Ace bandage. A cryocuff was also applied over the Ace wrap. The limb was placed in an immobilizer to be utilized with ambulation until adequate quad function returns. All needle and sponge counts matched the initial count per circulating and scrub personnel x2 prior to ending the case and the patient was awakened and taken to the recovery room in stable condition with brisk capillary refill to the operative limb and having tolerated the procedure well. Katty Anders PA-C assisted me during this surgery. His services were required to assist with the procedure by providing help with patient positioning and prepping, exposure, retraction and closure. Electronically signed by:  Blair Frausto, [address] [place] [address] and Sports Medicine

## 2019-08-09 ENCOUNTER — HOSPITAL ENCOUNTER (OUTPATIENT)
Dept: PHYSICAL THERAPY | Age: 74
Setting detail: THERAPIES SERIES
Discharge: HOME | End: 2019-08-09
Payer: MEDICARE

## 2019-08-09 ENCOUNTER — OFFICE VISIT (OUTPATIENT)
Dept: ORTHOPEDIC SURGERY | Age: 74
End: 2019-08-09

## 2019-08-09 VITALS
WEIGHT: 270 LBS | HEIGHT: 69 IN | HEART RATE: 90 BPM | SYSTOLIC BLOOD PRESSURE: 129 MMHG | DIASTOLIC BLOOD PRESSURE: 62 MMHG | BODY MASS INDEX: 39.99 KG/M2

## 2019-08-09 DIAGNOSIS — M17.11 PRIMARY OSTEOARTHRITIS OF RIGHT KNEE: Primary | ICD-10-CM

## 2019-08-09 PROCEDURE — 99024 POSTOP FOLLOW-UP VISIT: CPT | Performed by: PHYSICIAN ASSISTANT

## 2019-08-30 ENCOUNTER — OFFICE VISIT (OUTPATIENT)
Dept: ORTHOPEDIC SURGERY | Age: 74
End: 2019-08-30

## 2019-08-30 VITALS
DIASTOLIC BLOOD PRESSURE: 84 MMHG | BODY MASS INDEX: 39.99 KG/M2 | SYSTOLIC BLOOD PRESSURE: 149 MMHG | HEART RATE: 110 BPM | WEIGHT: 270 LBS | HEIGHT: 69 IN

## 2019-08-30 DIAGNOSIS — Z96.651 S/P TOTAL KNEE ARTHROPLASTY, RIGHT: Primary | ICD-10-CM

## 2019-08-30 PROCEDURE — 99024 POSTOP FOLLOW-UP VISIT: CPT | Performed by: ORTHOPAEDIC SURGERY

## 2019-08-30 RX ORDER — ACETAMINOPHEN 325 MG/1
325 TABLET ORAL EVERY 6 HOURS PRN
Qty: 120 TABLET | Refills: 0 | Status: SHIPPED | OUTPATIENT
Start: 2019-08-30

## 2019-09-30 ENCOUNTER — OFFICE VISIT (OUTPATIENT)
Dept: ORTHOPEDIC SURGERY | Age: 74
End: 2019-09-30

## 2019-09-30 VITALS
HEIGHT: 69 IN | SYSTOLIC BLOOD PRESSURE: 131 MMHG | BODY MASS INDEX: 39.69 KG/M2 | DIASTOLIC BLOOD PRESSURE: 76 MMHG | WEIGHT: 268 LBS | HEART RATE: 86 BPM

## 2019-09-30 DIAGNOSIS — M17.11 PRIMARY OSTEOARTHRITIS OF RIGHT KNEE: Primary | ICD-10-CM

## 2019-09-30 PROCEDURE — 99024 POSTOP FOLLOW-UP VISIT: CPT | Performed by: PHYSICIAN ASSISTANT

## 2019-11-07 RX ORDER — CALCIUM CARBONATE/VITAMIN D3 600 MG-20
TABLET ORAL
Qty: 30 CAPSULE | Refills: 3 | Status: SHIPPED | OUTPATIENT
Start: 2019-11-07

## 2020-07-22 ENCOUNTER — HOSPITAL ENCOUNTER (OUTPATIENT)
Age: 75
Discharge: HOME | End: 2020-07-22
Payer: MEDICARE

## 2020-07-22 ENCOUNTER — HOSPITAL ENCOUNTER (OUTPATIENT)
Dept: GENERAL RADIOLOGY | Age: 75
Discharge: HOME | End: 2020-07-22
Payer: MEDICARE

## 2020-07-22 PROCEDURE — 72050 X-RAY EXAM NECK SPINE 4/5VWS: CPT

## 2021-07-29 ENCOUNTER — HOSPITAL ENCOUNTER (OUTPATIENT)
Dept: WOMENS IMAGING | Age: 76
Discharge: HOME | End: 2021-07-29
Payer: MEDICARE

## 2021-07-29 VITALS — HEIGHT: 69 IN | BODY MASS INDEX: 39.58 KG/M2

## 2021-07-29 DIAGNOSIS — Z12.31 VISIT FOR SCREENING MAMMOGRAM: ICD-10-CM

## 2021-07-29 PROCEDURE — 77063 BREAST TOMOSYNTHESIS BI: CPT

## 2021-08-02 ENCOUNTER — TELEPHONE (OUTPATIENT)
Dept: WOMENS IMAGING | Age: 76
End: 2021-08-02

## 2021-08-20 ENCOUNTER — HOSPITAL ENCOUNTER (OUTPATIENT)
Dept: ULTRASOUND IMAGING | Age: 76
Discharge: HOME | End: 2021-08-20
Payer: MEDICARE

## 2021-08-20 ENCOUNTER — HOSPITAL ENCOUNTER (OUTPATIENT)
Dept: WOMENS IMAGING | Age: 76
Discharge: HOME | End: 2021-08-20
Payer: MEDICARE

## 2021-08-20 DIAGNOSIS — R92.8 ABNORMAL MAMMOGRAM: ICD-10-CM

## 2021-08-20 DIAGNOSIS — R92.8 ABNORMAL FINDING ON MAMMOGRAPHY: ICD-10-CM

## 2021-08-20 PROCEDURE — 76642 ULTRASOUND BREAST LIMITED: CPT

## 2021-08-20 PROCEDURE — G0279 TOMOSYNTHESIS, MAMMO: HCPCS

## 2022-02-21 ENCOUNTER — HOSPITAL ENCOUNTER (OUTPATIENT)
Dept: WOMENS IMAGING | Age: 77
Discharge: HOME | End: 2022-02-21
Payer: MEDICARE

## 2022-02-21 VITALS — HEIGHT: 70 IN | BODY MASS INDEX: 35.79 KG/M2 | WEIGHT: 250 LBS

## 2022-02-21 DIAGNOSIS — R92.2 BREAST DENSITY: ICD-10-CM

## 2022-02-21 PROCEDURE — G0279 TOMOSYNTHESIS, MAMMO: HCPCS

## 2023-06-05 ENCOUNTER — HOSPITAL ENCOUNTER (OUTPATIENT)
Dept: ULTRASOUND IMAGING | Age: 78
Discharge: HOME | End: 2023-06-05
Payer: MEDICARE

## 2023-06-05 ENCOUNTER — HOSPITAL ENCOUNTER (OUTPATIENT)
Dept: WOMENS IMAGING | Age: 78
Discharge: HOME | End: 2023-06-05
Payer: MEDICARE

## 2023-06-05 VITALS — HEIGHT: 69 IN | BODY MASS INDEX: 37.03 KG/M2 | WEIGHT: 250 LBS

## 2023-06-05 DIAGNOSIS — R92.2 BREAST DENSITY: ICD-10-CM

## 2023-06-05 PROCEDURE — G0279 TOMOSYNTHESIS, MAMMO: HCPCS

## 2025-06-17 ENCOUNTER — HOSPITAL ENCOUNTER (OUTPATIENT)
Dept: WOMENS IMAGING | Age: 80
Discharge: HOME | End: 2025-06-17
Payer: MEDICARE

## 2025-06-17 VITALS — HEIGHT: 69 IN | BODY MASS INDEX: 35.55 KG/M2 | WEIGHT: 240 LBS

## 2025-06-17 DIAGNOSIS — Z12.31 ENCOUNTER FOR SCREENING MAMMOGRAM FOR BREAST CANCER: ICD-10-CM

## 2025-06-17 PROCEDURE — 77063 BREAST TOMOSYNTHESIS BI: CPT

## 2025-06-30 NOTE — PROGRESS NOTES
Criteria met to transfer to [address] [place] [address],family at bedside,vss. Home medications: Nifedipine 60 mg daily, metoprolol succinate 25 mg daily and Imdur ER 30 mg daily     cont Nifedipine 60 mg daily, Metoprolol succinate 25 mg daily, Imdur ER 30 mg daily   Monitor BP and adjust accordingly as needed Home medications: Glipizide 10 mg daily and Tradjenta 5 mg daily   HbA1c 6.3% in April, repeat HbA1c on 06/23 is 6.8%  ISS  Monitor BG levels and adjust accordingly Home medications: Glipizide 10 mg daily and Tradjenta 5 mg daily   A1c 6.3 in Apr, rpt in am  ISS  Monitor CBGs and adjust accordingly Home medications: Glipizide 10 mg daily and Tradjenta 5 mg daily   HbA1c 6.3% in April, repeat HbA1c on 06/23 is 6.8%  Maintain PARAMJIT, FSG monitoring, hypoglycemia protocol Home medications: Nifedipine 60 mg daily, metoprolol succinate 25 mg daily and Imdur ER 30 mg daily     cont Nifedipine 60 mg daily, Metoprolol succinate 25 mg daily, Imdur ER 30 mg daily   Monitor BP and adjust accordingly as needed Home medications: Nifedipine 60 mg daily, metoprolol succinate 25 mg daily and Imdur ER 30 mg daily     cont Nifedipine 60 mg daily, Metoprolol succinate 25 mg daily, Imdur ER 30 mg daily   Monitor BP and adjust accordingly as needed Home medications: Glipizide 10 mg daily and Tradjenta 5 mg daily     ISS  Monitor CBGs and adjust accordingly Home medications: Glipizide 10 mg daily and Tradjenta 5 mg daily     ISS  Monitor CBGs and adjust accordingly Home medications: Nifedipine 60 mg daily, metoprolol succinate 25 mg daily and Imdur ER 30 mg daily     cont Nifedipine 60 mg daily, Metoprolol succinate 25 mg daily, Imdur ER 30 mg daily   Monitor BP and adjust accordingly as needed Home medications: Nifedipine 60 mg daily, metoprolol succinate 25 mg daily and Imdur ER 30 mg daily     cont Nifedipine 60 mg daily, Metoprolol succinate 25 mg daily, Imdur ER 30 mg daily   Monitor BP and adjust accordingly as needed Home medications: Nifedipine 60 mg daily, metoprolol succinate 25 mg daily and Imdur ER 30 mg daily     cont Nifedipine 60 mg daily, Metoprolol succinate 25 mg daily, Imdur ER 30 mg daily   Monitor BP and adjust accordingly as needed

## (undated) DEVICE — SPLINT KNEE UNIV FOR LESS THAN 36IN L20IN FOAM LAM E CNTCT

## (undated) DEVICE — HOOD, PEEL-AWAY: Brand: FLYTE

## (undated) DEVICE — HOOD: Brand: FLYTE

## (undated) DEVICE — PADDING UNDERCAST W4INXL4YD 100% COT CRIMPED FINISH WBRL II

## (undated) DEVICE — GENESIS TROCHLEAR PIN 1/8 X 3: Brand: GENESIS

## (undated) DEVICE — NAVIO FLAT MARKERS: Brand: NAVIO

## (undated) DEVICE — SKIN AFFIX SURG ADHESIVE 72/CS 0.55ML: Brand: MEDLINE

## (undated) DEVICE — HANDPIECE SET WITH HIGH FLOW TIP AND SUCTION TUBE: Brand: INTERPULSE

## (undated) DEVICE — FAIRFIELD KNEE LF

## (undated) DEVICE — ZIMMER® STERILE DISPOSABLE TOURNIQUET CUFF WITH PLC, DUAL PORT, SINGLE BLADDER, 34 IN. (86 CM)

## (undated) DEVICE — DRESSING W4XL8IN ISLANDTHERABOND 3D

## (undated) DEVICE — BIPOLAR SEALER 23-301-1 AQM MBS: Brand: AQUAMANTYS™

## (undated) DEVICE — 450 ML BOTTLE OF 0.05% CHLORHEXIDINE GLUCONATE IN 99.95% STERILE WATER FOR IRRIGATION, USP AND APPLICATOR.: Brand: IRRISEPT ANTIMICROBIAL WOUND LAVAGE

## (undated) DEVICE — 2108 SERIES SAGITTAL BLADE (19.5 X 1.27 X 110.0MM)

## (undated) DEVICE — GLOVE SURG SZ 8.5 L11.85IN FNGR THK9.8MIL STRW LTX POLYMER

## (undated) DEVICE — Device: Brand: STABLECUT®

## (undated) DEVICE — SUTURE VCRL SZ 0 L18IN ABSRB UD L36MM CT-1 1/2 CIR J840D

## (undated) DEVICE — DRAPE,U/ SHT,SPLIT,PLAS,STERIL: Brand: MEDLINE

## (undated) DEVICE — CHLORAPREP 26ML ORANGE

## (undated) DEVICE — SHEET,DRAPE,53X77,STERILE: Brand: MEDLINE

## (undated) DEVICE — SUTURE STRATAFIX SZ 1 L14IN ABSRB VLT L36MM MO-4 TAPERPOINT SXPD2B400

## (undated) DEVICE — TOTAL BASIC PK

## (undated) DEVICE — PEN: MARKING STD 100/CS: Brand: MEDICAL ACTION INDUSTRIES

## (undated) DEVICE — COMPONENT KNEE LOWER EXTREMITIES. ANCIL ZIRCONIUM NAVIO DISP

## (undated) DEVICE — SUTURE VCRL SZ 2-0 L18IN ABSRB UD CT-1 L36MM 1/2 CIR J839D

## (undated) DEVICE — 2108 SERIES SAGITTAL BLADE FAN, OFFSET  (29.0 X 0.89 X 73.0MM)

## (undated) DEVICE — STERILE TOTAL KNEE DRAPE PACK: Brand: CARDINAL HEALTH

## (undated) DEVICE — GLOVE ORANGE PI 8 1/2   MSG9085

## (undated) DEVICE — YANKAUER OPEN TIP, NO VENT: Brand: ARGYLE

## (undated) DEVICE — DRAPE C ARM UNIV W41XL74IN CLR PLAS XR VELC CLSR POLY STRP

## (undated) DEVICE — 35 ML SYRINGE LUER-LOCK TIP: Brand: MONOJECT

## (undated) DEVICE — COVER,MAYO STAND,XL,STERILE: Brand: MEDLINE

## (undated) DEVICE — SPONGE LAP W18XL18IN WHT COT 4 PLY FLD STRUNG RADPQ DISP ST

## (undated) DEVICE — SYSTEM SKIN CLSR 22CM DERMBND PRINEO

## (undated) DEVICE — DRESSING CNTCT 4X12IN IS THERABOND 3D

## (undated) DEVICE — ELECTRODE PT RET AD L9FT HI MOIST COND ADH HYDRGEL CORDED

## (undated) DEVICE — 3 BONE CEMENT MIXER: Brand: MIXEVAC

## (undated) DEVICE — DECANTER FLD 9IN ST BG FOR ASEP TRNSF OF FLD

## (undated) DEVICE — TOTAL TRAY, DB, 100% SILI FOLEY, 16FR 10: Brand: MEDLINE

## (undated) DEVICE — Device

## (undated) DEVICE — GOWN,AURORA,NONREINF,RAGLAN,XXL,STERILE: Brand: MEDLINE

## (undated) DEVICE — NEEDLE HYPO 22GA L1.5IN BLK POLYPR HUB S STL REG BVL STR

## (undated) DEVICE — SUTURE MCRYL SZ 3-0 L27IN ABSRB UD L24MM PS-1 3/8 CIR PRIM Y936H

## (undated) DEVICE — BANDAGE COMPR W6INXL11YD E SGL LAYERED VELC CLSR SHUR-BAND